# Patient Record
Sex: MALE | Race: WHITE | NOT HISPANIC OR LATINO | Employment: FULL TIME
[De-identification: names, ages, dates, MRNs, and addresses within clinical notes are randomized per-mention and may not be internally consistent; named-entity substitution may affect disease eponyms.]

---

## 2017-09-08 ENCOUNTER — SURGERY (OUTPATIENT)
Age: 62
End: 2017-09-08

## 2017-09-08 ENCOUNTER — ANESTHESIA (OUTPATIENT)
Dept: ENDOSCOPY | Facility: HOSPITAL | Age: 62
DRG: 445 | End: 2017-09-08
Payer: COMMERCIAL

## 2017-09-08 ENCOUNTER — ANESTHESIA EVENT (OUTPATIENT)
Dept: ENDOSCOPY | Facility: HOSPITAL | Age: 62
DRG: 445 | End: 2017-09-08
Payer: COMMERCIAL

## 2017-09-08 ENCOUNTER — HOSPITAL ENCOUNTER (OUTPATIENT)
Facility: HOSPITAL | Age: 62
Discharge: HOME | DRG: 445 | End: 2017-09-09
Attending: HOSPITALIST | Admitting: HOSPITALIST
Payer: COMMERCIAL

## 2017-09-08 DIAGNOSIS — N17.9 AKI (ACUTE KIDNEY INJURY): ICD-10-CM

## 2017-09-08 DIAGNOSIS — R78.81 BACTEREMIA DUE TO GRAM-NEGATIVE BACTERIA: ICD-10-CM

## 2017-09-08 DIAGNOSIS — K80.50 CHOLEDOCHOLITHIASIS: ICD-10-CM

## 2017-09-08 DIAGNOSIS — R74.01 TRANSAMINITIS: ICD-10-CM

## 2017-09-08 DIAGNOSIS — I10 ESSENTIAL HYPERTENSION: Chronic | ICD-10-CM

## 2017-09-08 DIAGNOSIS — D72.829 LEUKOCYTOSIS, UNSPECIFIED TYPE: ICD-10-CM

## 2017-09-08 DIAGNOSIS — N18.30 CKD STAGE 3 DUE TO TYPE 2 DIABETES MELLITUS: Chronic | ICD-10-CM

## 2017-09-08 DIAGNOSIS — N18.30 TYPE 2 DIABETES MELLITUS WITH STAGE 3 CHRONIC KIDNEY DISEASE, WITHOUT LONG-TERM CURRENT USE OF INSULIN: Chronic | ICD-10-CM

## 2017-09-08 DIAGNOSIS — E11.22 CKD STAGE 3 DUE TO TYPE 2 DIABETES MELLITUS: Chronic | ICD-10-CM

## 2017-09-08 DIAGNOSIS — E80.6 HYPERBILIRUBINEMIA: ICD-10-CM

## 2017-09-08 DIAGNOSIS — K80.43 CALCULUS OF BILE DUCT WITH ACUTE CHOLECYSTITIS AND OBSTRUCTION: Primary | ICD-10-CM

## 2017-09-08 DIAGNOSIS — E11.22 TYPE 2 DIABETES MELLITUS WITH STAGE 3 CHRONIC KIDNEY DISEASE, WITHOUT LONG-TERM CURRENT USE OF INSULIN: Chronic | ICD-10-CM

## 2017-09-08 PROBLEM — K21.9 GERD (GASTROESOPHAGEAL REFLUX DISEASE): Chronic | Status: ACTIVE | Noted: 2017-09-08

## 2017-09-08 PROBLEM — E88.09 HYPERPROTEINEMIA: Status: ACTIVE | Noted: 2017-09-08

## 2017-09-08 PROBLEM — K80.42 CHOLEDOCHOLITHIASIS WITH ACUTE CHOLECYSTITIS: Status: ACTIVE | Noted: 2017-09-08

## 2017-09-08 PROBLEM — E11.9 TYPE 2 DIABETES MELLITUS WITHOUT COMPLICATION, WITHOUT LONG-TERM CURRENT USE OF INSULIN: Chronic | Status: ACTIVE | Noted: 2017-09-08

## 2017-09-08 PROBLEM — E83.52 HYPERCALCEMIA: Status: ACTIVE | Noted: 2017-09-08

## 2017-09-08 PROBLEM — E78.5 HLD (HYPERLIPIDEMIA): Chronic | Status: ACTIVE | Noted: 2017-09-08

## 2017-09-08 LAB
POCT GLUCOSE: 177 MG/DL (ref 70–110)
POCT GLUCOSE: 264 MG/DL (ref 70–110)
POCT GLUCOSE: 287 MG/DL (ref 70–110)
POCT GLUCOSE: 316 MG/DL (ref 70–110)

## 2017-09-08 PROCEDURE — 43259 EGD US EXAM DUODENUM/JEJUNUM: CPT | Performed by: INTERNAL MEDICINE

## 2017-09-08 PROCEDURE — 27201674 HC SPHINCTERTOME: Performed by: INTERNAL MEDICINE

## 2017-09-08 PROCEDURE — 43260 ERCP W/SPECIMEN COLLECTION: CPT | Mod: ,,, | Performed by: INTERNAL MEDICINE

## 2017-09-08 PROCEDURE — 25000003 PHARM REV CODE 250: Performed by: NURSE PRACTITIONER

## 2017-09-08 PROCEDURE — G0378 HOSPITAL OBSERVATION PER HR: HCPCS

## 2017-09-08 PROCEDURE — 11000001 HC ACUTE MED/SURG PRIVATE ROOM

## 2017-09-08 PROCEDURE — 25000003 PHARM REV CODE 250: Performed by: HOSPITALIST

## 2017-09-08 PROCEDURE — 99254 IP/OBS CNSLTJ NEW/EST MOD 60: CPT | Mod: ,,, | Performed by: STUDENT IN AN ORGANIZED HEALTH CARE EDUCATION/TRAINING PROGRAM

## 2017-09-08 PROCEDURE — G0379 DIRECT REFER HOSPITAL OBSERV: HCPCS

## 2017-09-08 PROCEDURE — 87040 BLOOD CULTURE FOR BACTERIA: CPT | Mod: 59

## 2017-09-08 PROCEDURE — 25000003 PHARM REV CODE 250: Performed by: NURSE ANESTHETIST, CERTIFIED REGISTERED

## 2017-09-08 PROCEDURE — 94761 N-INVAS EAR/PLS OXIMETRY MLT: CPT

## 2017-09-08 PROCEDURE — 25000003 PHARM REV CODE 250: Performed by: PHYSICIAN ASSISTANT

## 2017-09-08 PROCEDURE — C1769 GUIDE WIRE: HCPCS | Performed by: INTERNAL MEDICINE

## 2017-09-08 PROCEDURE — 63600175 PHARM REV CODE 636 W HCPCS: Performed by: NURSE ANESTHETIST, CERTIFIED REGISTERED

## 2017-09-08 PROCEDURE — 43260 ERCP W/SPECIMEN COLLECTION: CPT | Performed by: INTERNAL MEDICINE

## 2017-09-08 PROCEDURE — 37000008 HC ANESTHESIA 1ST 15 MINUTES: Performed by: INTERNAL MEDICINE

## 2017-09-08 PROCEDURE — 63600175 PHARM REV CODE 636 W HCPCS: Performed by: HOSPITALIST

## 2017-09-08 PROCEDURE — 99222 1ST HOSP IP/OBS MODERATE 55: CPT | Mod: ,,, | Performed by: INTERNAL MEDICINE

## 2017-09-08 PROCEDURE — 74328 X-RAY BILE DUCT ENDOSCOPY: CPT | Mod: 26,,, | Performed by: INTERNAL MEDICINE

## 2017-09-08 PROCEDURE — 63600175 PHARM REV CODE 636 W HCPCS: Performed by: NURSE PRACTITIONER

## 2017-09-08 PROCEDURE — 43237 ENDOSCOPIC US EXAM ESOPH: CPT | Mod: 51,,, | Performed by: INTERNAL MEDICINE

## 2017-09-08 PROCEDURE — 37000009 HC ANESTHESIA EA ADD 15 MINS: Performed by: INTERNAL MEDICINE

## 2017-09-08 RX ORDER — HYDROMORPHONE HYDROCHLORIDE 2 MG/ML
0.4 INJECTION, SOLUTION INTRAMUSCULAR; INTRAVENOUS; SUBCUTANEOUS EVERY 5 MIN PRN
Status: DISCONTINUED | OUTPATIENT
Start: 2017-09-08 | End: 2017-09-09 | Stop reason: HOSPADM

## 2017-09-08 RX ORDER — LIDOCAINE HCL/PF 100 MG/5ML
SYRINGE (ML) INTRAVENOUS
Status: DISCONTINUED | OUTPATIENT
Start: 2017-09-08 | End: 2017-09-08

## 2017-09-08 RX ORDER — SODIUM CHLORIDE 0.9 % (FLUSH) 0.9 %
3 SYRINGE (ML) INJECTION
Status: DISCONTINUED | OUTPATIENT
Start: 2017-09-08 | End: 2017-09-09 | Stop reason: HOSPADM

## 2017-09-08 RX ORDER — GLUCAGON 1 MG
1 KIT INJECTION
Status: DISCONTINUED | OUTPATIENT
Start: 2017-09-08 | End: 2017-09-09 | Stop reason: HOSPADM

## 2017-09-08 RX ORDER — ONDANSETRON 2 MG/ML
4 INJECTION INTRAMUSCULAR; INTRAVENOUS DAILY PRN
Status: DISCONTINUED | OUTPATIENT
Start: 2017-09-08 | End: 2017-09-09 | Stop reason: HOSPADM

## 2017-09-08 RX ORDER — PROPOFOL 10 MG/ML
VIAL (ML) INTRAVENOUS
Status: DISCONTINUED | OUTPATIENT
Start: 2017-09-08 | End: 2017-09-08

## 2017-09-08 RX ORDER — SODIUM CHLORIDE 9 MG/ML
INJECTION, SOLUTION INTRAVENOUS CONTINUOUS
Status: DISCONTINUED | OUTPATIENT
Start: 2017-09-08 | End: 2017-09-09 | Stop reason: HOSPADM

## 2017-09-08 RX ORDER — FENTANYL CITRATE 50 UG/ML
INJECTION, SOLUTION INTRAMUSCULAR; INTRAVENOUS
Status: DISCONTINUED | OUTPATIENT
Start: 2017-09-08 | End: 2017-09-08

## 2017-09-08 RX ORDER — IBUPROFEN 200 MG
16 TABLET ORAL
Status: DISCONTINUED | OUTPATIENT
Start: 2017-09-08 | End: 2017-09-09 | Stop reason: HOSPADM

## 2017-09-08 RX ORDER — MORPHINE SULFATE 2 MG/ML
6 INJECTION, SOLUTION INTRAMUSCULAR; INTRAVENOUS EVERY 4 HOURS PRN
Status: DISCONTINUED | OUTPATIENT
Start: 2017-09-08 | End: 2017-09-09 | Stop reason: HOSPADM

## 2017-09-08 RX ORDER — CYCLOBENZAPRINE HCL 10 MG
10 TABLET ORAL NIGHTLY PRN
Status: DISCONTINUED | OUTPATIENT
Start: 2017-09-08 | End: 2017-09-09 | Stop reason: HOSPADM

## 2017-09-08 RX ORDER — RAMELTEON 8 MG/1
8 TABLET ORAL NIGHTLY PRN
Status: DISCONTINUED | OUTPATIENT
Start: 2017-09-08 | End: 2017-09-09 | Stop reason: HOSPADM

## 2017-09-08 RX ORDER — IBUPROFEN 200 MG
24 TABLET ORAL
Status: DISCONTINUED | OUTPATIENT
Start: 2017-09-08 | End: 2017-09-09 | Stop reason: HOSPADM

## 2017-09-08 RX ORDER — ONDANSETRON 8 MG/1
8 TABLET, ORALLY DISINTEGRATING ORAL EVERY 8 HOURS PRN
Status: DISCONTINUED | OUTPATIENT
Start: 2017-09-08 | End: 2017-09-09 | Stop reason: HOSPADM

## 2017-09-08 RX ORDER — INSULIN ASPART 100 [IU]/ML
0-5 INJECTION, SOLUTION INTRAVENOUS; SUBCUTANEOUS
Status: DISCONTINUED | OUTPATIENT
Start: 2017-09-08 | End: 2017-09-09 | Stop reason: HOSPADM

## 2017-09-08 RX ORDER — AMLODIPINE BESYLATE 5 MG/1
10 TABLET ORAL DAILY
Status: DISCONTINUED | OUTPATIENT
Start: 2017-09-09 | End: 2017-09-09 | Stop reason: HOSPADM

## 2017-09-08 RX ORDER — ACETAMINOPHEN 325 MG/1
650 TABLET ORAL EVERY 6 HOURS PRN
Status: DISCONTINUED | OUTPATIENT
Start: 2017-09-08 | End: 2017-09-09 | Stop reason: HOSPADM

## 2017-09-08 RX ORDER — PANTOPRAZOLE SODIUM 40 MG/1
40 TABLET, DELAYED RELEASE ORAL DAILY
Status: DISCONTINUED | OUTPATIENT
Start: 2017-09-09 | End: 2017-09-09 | Stop reason: HOSPADM

## 2017-09-08 RX ORDER — PROPOFOL 10 MG/ML
VIAL (ML) INTRAVENOUS CONTINUOUS PRN
Status: DISCONTINUED | OUTPATIENT
Start: 2017-09-08 | End: 2017-09-08

## 2017-09-08 RX ORDER — MORPHINE SULFATE 2 MG/ML
2 INJECTION, SOLUTION INTRAMUSCULAR; INTRAVENOUS EVERY 4 HOURS PRN
Status: DISCONTINUED | OUTPATIENT
Start: 2017-09-08 | End: 2017-09-09 | Stop reason: HOSPADM

## 2017-09-08 RX ORDER — MIDAZOLAM HYDROCHLORIDE 1 MG/ML
INJECTION, SOLUTION INTRAMUSCULAR; INTRAVENOUS
Status: DISCONTINUED | OUTPATIENT
Start: 2017-09-08 | End: 2017-09-08

## 2017-09-08 RX ADMIN — MEROPENEM 1 G: 1 INJECTION, POWDER, FOR SOLUTION INTRAVENOUS at 10:09

## 2017-09-08 RX ADMIN — INSULIN ASPART 3 UNITS: 100 INJECTION, SOLUTION INTRAVENOUS; SUBCUTANEOUS at 12:09

## 2017-09-08 RX ADMIN — SODIUM CHLORIDE: 0.9 INJECTION, SOLUTION INTRAVENOUS at 06:09

## 2017-09-08 RX ADMIN — TOPICAL ANESTHETIC 1 EACH: 200 SPRAY DENTAL; PERIODONTAL at 12:09

## 2017-09-08 RX ADMIN — PROPOFOL 10 MG: 10 INJECTION, EMULSION INTRAVENOUS at 12:09

## 2017-09-08 RX ADMIN — MEROPENEM 1 G: 1 INJECTION, POWDER, FOR SOLUTION INTRAVENOUS at 12:09

## 2017-09-08 RX ADMIN — MIDAZOLAM 2 MG: 1 INJECTION INTRAMUSCULAR; INTRAVENOUS at 12:09

## 2017-09-08 RX ADMIN — ACETAMINOPHEN 650 MG: 325 TABLET ORAL at 05:09

## 2017-09-08 RX ADMIN — MORPHINE SULFATE 2 MG: 2 INJECTION, SOLUTION INTRAMUSCULAR; INTRAVENOUS at 08:09

## 2017-09-08 RX ADMIN — LIDOCAINE HYDROCHLORIDE 50 MG: 20 INJECTION, SOLUTION INTRAVENOUS at 12:09

## 2017-09-08 RX ADMIN — PROPOFOL 150 MCG/KG/MIN: 10 INJECTION, EMULSION INTRAVENOUS at 12:09

## 2017-09-08 RX ADMIN — CYCLOBENZAPRINE HYDROCHLORIDE 10 MG: 10 TABLET, FILM COATED ORAL at 10:09

## 2017-09-08 RX ADMIN — SODIUM CHLORIDE: 0.9 INJECTION, SOLUTION INTRAVENOUS at 10:09

## 2017-09-08 RX ADMIN — FENTANYL CITRATE 100 MCG: 50 INJECTION, SOLUTION INTRAMUSCULAR; INTRAVENOUS at 12:09

## 2017-09-08 NOTE — ASSESSMENT & PLAN NOTE
CKD 3   BUN 33/Cr 1.9 with unknown baseline. Nephrologist is Dr. Lon Das in [place].  Continue IVF.  Holding losartan-HCTZ.  Avoid nephrotoxins and renally dose meds. Monitor.

## 2017-09-08 NOTE — HOSPITAL COURSE
"He was seen promptly upon arrival by [facility].  Dr. Roque Lindsay performed EUS and ERCP finding no stones.  He was feeling much better and requesting to eat and drink.  His wife, Ania, was present and stated that he looked "a million times better" than earlier when he was in the ED at [facility].  He denied abdominal pain, nausea, chest pain, and shortness of breath.  General Surgery was also consulted to see him and recommended further management outpatient.  One of the two sets of blood cultures taken at [place] grew Gram negative rods.  He was already on meropenem.  Repeat blood cultures were taken.  He was discharged home on 9/9/17 with prescriptions for 7 days of ciprofloxacin and metronidazole.  He will follow up with a surgeon back home.  "

## 2017-09-08 NOTE — ASSESSMENT & PLAN NOTE
Hyperproteinemia   Likely elevated 2/2 dehydration. Corrected calcium is 9.7.  Continue IVF. Monitor.

## 2017-09-08 NOTE — ASSESSMENT & PLAN NOTE
Pt states last A1c 7.7%.  BG > 200 since arrival to [facility].  On glipizide 10 mg BID, metformin 850 mg TID, nateglinide 60 mg prn BG > 200, pioglitazone 15 mg daily and saxaglipitin 5 mg daily at home. Holding all here. Check blood glucose AC and HS and use SSI.  Check A1c.  Diabetic diet when tolerating PO.  Endocrinologist is Dr. Delfino Cr.

## 2017-09-08 NOTE — CONSULTS
[facility]  Gastroenterology  Consult Note    Patient Name: Elmer Boateng  MRN: [id number]  Admission Date: 9/8/2017  Hospital Length of Stay: 0 days  Code Status: Full Code   Attending Provider: Gaetano Mota MD   Consulting Provider: Gregoria Spears MD  Primary Care Physician: Raffaele Ballard MD  Principal Problem:Calculus of bile duct with acute cholecystitis and obstruction    Consults  Subjective:     HPI:  This is a 62-year-old male with a past medical history of diabetes and hypertension who presents with 6 days of abdominal pain.  He states the pain is located in the lower chest and epigastric region.  It is nonradiating and constant.  It is described as a sharp pain, moderate in severity.  Some associated nausea but no vomiting.  Denies any melena, changes in bowel habits.  He does endorse some dark urine.  He denies any similar symptoms in the past.  No other exacerbating or relieving factors or other associated symptoms. No fevers/chills.     Past Medical History:   Diagnosis Date    Diabetes mellitus     GERD (gastroesophageal reflux disease)     HTN (hypertension)     Hypercholesteremia        History reviewed. No pertinent surgical history.    Review of patient's allergies indicates:  No Known Allergies  Family History     None        Social History Main Topics    Smoking status: Never Smoker    Smokeless tobacco: Never Used    Alcohol use Not on file    Drug use: Unknown    Sexual activity: Not on file     Review of Systems   Constitutional: Negative for chills and fever.   HENT: Negative for postnasal drip and trouble swallowing.    Eyes: Negative for pain and visual disturbance.   Respiratory: Negative for cough, choking and shortness of breath.    Cardiovascular: Negative for chest pain and leg swelling.   Gastrointestinal: Positive for abdominal distention, abdominal pain and nausea. Negative for anal bleeding, blood in stool, constipation, diarrhea, rectal pain and  vomiting.   Endocrine: Negative for cold intolerance and heat intolerance.   Genitourinary: Negative for difficulty urinating and hematuria.   Musculoskeletal: Positive for arthralgias. Negative for back pain.   Neurological: Negative for dizziness and numbness.   Hematological: Negative for adenopathy. Does not bruise/bleed easily.   Psychiatric/Behavioral: Negative for agitation and confusion.     Objective:     Vital Signs (Most Recent):  Temp: 99.9 °F (37.7 °C) (09/08/17 0925)  Pulse: 72 (09/08/17 0925)  Resp: 19 (09/08/17 0925)  BP: 139/69 (09/08/17 0925)  SpO2: 96 % (09/08/17 0925) Vital Signs (24h Range):  Temp:  [97.6 °F (36.4 °C)-99.9 °F (37.7 °C)] 99.9 °F (37.7 °C)  Pulse:  [57-84] 72  Resp:  [14-20] 19  SpO2:  [94 %-100 %] 96 %  BP: (106-162)/(56-72) 139/69     Weight: 86.8 kg (191 lb 5.8 oz) (09/08/17 0925)  Body mass index is 28.26 kg/m².    No intake or output data in the 24 hours ending 09/08/17 1114    Lines/Drains/Airways     Peripheral Intravenous Line                 Peripheral IV - Single Lumen 09/08/17 0301 Right Antecubital less than 1 day                Physical Exam   Constitutional: He is oriented to person, place, and time. He appears well-developed and well-nourished. No distress.   HENT:   Head: Normocephalic and atraumatic.   Eyes: Conjunctivae are normal. Scleral icterus is present.   Neck: No tracheal deviation present. No thyromegaly present.   Cardiovascular: Normal rate, regular rhythm and normal heart sounds.  Exam reveals no gallop and no friction rub.    No murmur heard.  Pulmonary/Chest: Effort normal and breath sounds normal. He has no wheezes. He has no rales.   Abdominal: Soft. Bowel sounds are normal. He exhibits no distension. There is tenderness. There is no rebound and no guarding.   Musculoskeletal: Normal range of motion. He exhibits no edema or tenderness.   Neurological: He is alert and oriented to person, place, and time.   Skin: He is not diaphoretic.   Psychiatric:  He has a normal mood and affect. His behavior is normal.       Significant Labs:  CMP:   Recent Labs  Lab 09/08/17  0302   *   CALCIUM 10.3*   ALBUMIN 4.7   PROT 8.5*      K 4.4   CO2 28   CL 95   BUN 33*   CREATININE 1.90*   ALKPHOS 150*   *   *   BILITOT 3.4*       Significant Imaging:  U/S: I have reviewed all results within the past 24 hours and my personal findings are:  cbd dilated    Assessment/Plan:     Transaminitis    - imaging reviewed, biliary ductal dilation with tb 3.4 and elevated transaminases  - will proceed with EUS, ERCP if indicated   - risks/benefits explained in detail   - d/w Dr. Lindsay  - IVF   - pain control  - surgery consulted            Thank you for your consult. I will follow-up with patient. Please contact us if you have any additional questions.    Gregoria Spears MD  Gastroenterology  [facility]

## 2017-09-08 NOTE — H&P
"[facility] Hospital Medicine  History & Physical    Patient Name: Elmer Boateng  MRN: [id number]  Admission Date: 9/8/2017  Attending Physician: Gaetano Mota MD   Primary Care Provider: Raffaele Ballard MD         Patient information was obtained from patient, spouse/SO and ER records.     Subjective:     Principal Problem:Calculus of bile duct with acute cholecystitis and obstruction    Chief Complaint: No chief complaint on file.       HPI: 63 yo male with history of diabetes, hypertension and hyperlipidemia presented to [facility] in [place], [place] on 9/8/2017 with c/o midepigastric abdominal pain and chest pain accompanied by nausea and vomiting for past 5 days. Work-up there concerning for choledocholithiasis and cholecystitis. He was transferred to [facility] for admission to [facility] with GI consult.  Elevated WBC, t. Bili, Alk Phos, AST, ALT, calcium and protein.      Pt was already seen by GI and EUS and ERCP performed showing no stones.  Pt feeling much better and requesting to eat and drink.  Wife, Ania, present and states that he looks "a million times better" than earlier today when he was in the ED at [facility].   Denies abdominal pain, nausea, CP, SOB.     Pt has had numerous kidney stones and has had 2 surgical removals and 2 lithotripsy procedures in the past.  Pt denies use of alcohol, tobacco or illicit drugs.  Works in an oil field support role.  with a 17 yo son.  PCP is Dr. Raffaele Ballard; Endocrinologist is Dr. Delfino Cr; Nephrologist is Dr. Lon Das; Surgeon is Dr. Delfino Gutierrez.          Past Medical History:   Diagnosis Date    Diabetes mellitus     GERD (gastroesophageal reflux disease)     HTN (hypertension)     Hypercholesteremia        Past Surgical History:   Procedure Laterality Date    HERNIA REPAIR      KNEE SURGERY      LITHOTRIPSY         Review of patient's allergies indicates:  No Known " Allergies    Current Facility-Administered Medications on File Prior to Encounter   Medication    [COMPLETED] 0.9%  NaCl infusion    [COMPLETED] morphine injection 4 mg    [COMPLETED] morphine injection 4 mg    [COMPLETED] ondansetron injection 4 mg    [COMPLETED] piperacillin-tazobactam 4.5 g in dextrose 5 % 100 mL IVPB (ready to mix system)    [COMPLETED] prochlorperazine injection Soln 10 mg    [COMPLETED] sodium chloride 0.9% bolus 1,000 mL     Current Outpatient Prescriptions on File Prior to Encounter   Medication Sig    amlodipine (NORVASC) 10 MG tablet Take 10 mg by mouth once daily.    bromocriptine (PARLODEL) 2.5 mg Tab Take 2.5 mg by mouth once daily.    cholecalciferol, vitamin D3, 5,000 unit TbDL Take 1 tablet by mouth once daily.    glipiZIDE (GLUCOTROL) 10 MG TR24 Take 10 mg by mouth 2 (two) times daily.     losartan-hydrochlorothiazide 50-12.5 mg (HYZAAR) 50-12.5 mg per tablet Take 1 tablet by mouth once daily.    metformin (GLUCOPHAGE) 850 MG tablet Take 850 mg by mouth 3 (three) times daily.    pantoprazole (PROTONIX) 40 MG tablet Take 40 mg by mouth once daily.    pioglitazone (ACTOS) 15 MG tablet Take 15 mg by mouth once daily.    pravastatin (PRAVACHOL) 20 MG tablet Take 20 mg by mouth once daily.    SAXagliptin (ONGLYZA) 5 mg Tab tablet Take by mouth once daily.    cyclobenzaprine (FLEXERIL) 10 MG tablet Take 10 mg by mouth nightly as needed for Muscle spasms.     nateglinide (STARLIX) 120 MG tablet Take 60 mg by mouth daily as needed (If blood glucose > 200).     [DISCONTINUED] co-enzyme Q-10 30 mg capsule Take 30 mg by mouth 3 (three) times daily.     Family History     Problem Relation (Age of Onset)    COPD Mother    Diabetes Mother, Sister    Heart disease Father        Social History Main Topics    Smoking status: Never Smoker    Smokeless tobacco: Never Used    Alcohol use No    Drug use: No    Sexual activity: Not on file     Review of Systems   Constitutional:  Positive for appetite change. Negative for chills, fatigue and fever.   HENT: Negative for congestion, postnasal drip, sneezing and sore throat.    Eyes: Negative for discharge, redness and itching.   Respiratory: Negative for cough, shortness of breath and wheezing.    Cardiovascular: Negative for chest pain, palpitations and leg swelling.   Gastrointestinal: Positive for abdominal pain and vomiting. Negative for abdominal distention, blood in stool, constipation and diarrhea.   Endocrine: Negative for polydipsia, polyphagia and polyuria.   Genitourinary: Negative for difficulty urinating, dysuria, flank pain, frequency, hematuria and urgency.   Musculoskeletal: Negative for arthralgias and myalgias.   Skin: Negative for pallor, rash and wound.   Neurological: Negative for dizziness, syncope, weakness, light-headedness, numbness and headaches.   Psychiatric/Behavioral: Negative for agitation and confusion. The patient is not nervous/anxious.      Objective:     Vital Signs (Most Recent):  Temp: 99 °F (37.2 °C) (09/08/17 1436)  Pulse: 66 (09/08/17 1436)  Resp: (!) 24 (09/08/17 1436)  BP: (!) 169/77 (09/08/17 1436)  SpO2: 98 % (09/08/17 1405) Vital Signs (24h Range):  Temp:  [97.1 °F (36.2 °C)-99.9 °F (37.7 °C)] 99 °F (37.2 °C)  Pulse:  [57-84] 66  Resp:  [9-24] 24  SpO2:  [94 %-100 %] 98 %  BP: ()/(52-77) 169/77     Weight: 86.8 kg (191 lb 5.8 oz)  Body mass index is 28.26 kg/m².    Physical Exam   Constitutional: He is oriented to person, place, and time. He appears well-developed and well-nourished. No distress.   HENT:   Head: Normocephalic and atraumatic.   Mouth/Throat: Oropharynx is clear and moist. No oropharyngeal exudate.   Eyes: Conjunctivae and EOM are normal. Pupils are equal, round, and reactive to light. No scleral icterus.   Neck: Normal range of motion. Neck supple. No JVD present. No thyromegaly present.   Cardiovascular: Normal rate and regular rhythm.    No murmur heard.  Pulmonary/Chest:  Effort normal and breath sounds normal. No respiratory distress. He has no wheezes. He exhibits no tenderness.   Abdominal: Soft. Bowel sounds are normal. He exhibits no distension. There is no tenderness. There is no rebound and no guarding.   Musculoskeletal: Normal range of motion. He exhibits no edema or deformity.   Neurological: He is alert and oriented to person, place, and time.   Skin: Skin is warm and dry. No rash noted. He is not diaphoretic.   Psychiatric: He has a normal mood and affect. His behavior is normal. Judgment and thought content normal.        Significant Labs:   CBC:   Recent Labs  Lab 09/08/17  0302   WBC 14.30*   HGB 12.1*   HCT 35.7*        CMP:   Recent Labs  Lab 09/08/17 0302      K 4.4   CL 95   CO2 28   *   BUN 33*   CREATININE 1.90*   CALCIUM 10.3*   PROT 8.5*   ALBUMIN 4.7   BILITOT 3.4*   ALKPHOS 150*   *   *   EGFRNONAA 37*     Troponin:   Recent Labs  Lab 09/08/17 0302   TROPONINI <0.012     Urine Studies:   Recent Labs  Lab 09/08/17  0535   COLORU Yellow   APPEARANCEUA Clear   PHUR 5.5   SPECGRAV 1.015   PROTEINUA 30*   GLUCUA 500*   KETONESU 15*   BILIRUBINUA Negative   OCCULTUA Trace*   NITRITE Negative   UROBILINOGEN 2.0*   LEUKOCYTESUR Negative   RBCUA 0   WBCUA 1   BACTERIA None   SQUAMEPITHEL 1   HYALINECASTS 0       Significant Imaging:     US Abdomen 8/8/2017:  Evidence of tiny gallstones/sludge with no sonographic findings to suggest acute cholecystitis.  Mild dilatation of the CBD with no sonographic evidence of choledocholithiasis.    Flat and erect abdomen 9/8/17:  Nonobstructive bowel gas pattern. Bilateral nephrolithiasis.    CXR PA and lateral 9/8/2017:   Findings: Cardiac silhouette is normal in size. No focal infiltrate or pleural effusion. There is mild thoracic spondylosis.    CT Abdomen/Pelvis 9/8/2017:  No evidence of an acute intra-abdominal abnormality.  Mildly distended gallbladder containing sludge and/or  gallstones.  Bilateral nephrolithiasis without signs of obstructive uropathy.  Colonic diverticulosis.        Assessment/Plan:     * Calculus of bile duct with acute cholecystitis and obstruction    Leukocytosis, hyperbilirubinemia, transaminitis   Pt presented to OSH with c/o abdominal pain and vomiting x 5 days.  WBC 14K, T. Bili 3.4,  and . Imaging suggests gallstones and mild dilation in CBD.  GI performed EUS/ERCP this morning: no stones seen.  Continue meropenem, PRN analgesia and IVF.  Pt requesting to have surgery done in [place] (Dr. Delfino Gutierrez) if it can possibly wait. General Surgery consult.                 Type 2 diabetes mellitus with stage 3 chronic kidney disease, without long-term current use of insulin    Pt states last A1c 7.7%.  BG > 200 since arrival to [facility].  On glipizide 10 mg BID, metformin 850 mg TID, nateglinide 60 mg prn BG > 200, pioglitazone 15 mg daily and saxaglipitin 5 mg daily at home. Holding all here. Check blood glucose AC and HS and use SSI.  Check A1c.  Diabetic diet when tolerating PO.  Endocrinologist is Dr. Delfino Cr.           KATHARINE (acute kidney injury)    CKD 3   BUN 33/Cr 1.9 with unknown baseline. Nephrologist is Dr. Lon Das in [place].  Continue IVF.  Holding losartan-HCTZ.  Avoid nephrotoxins and renally dose meds. Monitor.           GERD (gastroesophageal reflux disease)    Continue home pantaprazole 40 mg daily.           Essential hypertension    Fluctuating BP.  On amlodipine 10 mg daily and losartan-HCTZ 50-12.5 mg daily at home.  Monitor. Resume home amlodipine with parameters. Hold losartan-HCTZ due to elevated creatinine with unknown baseline.           HLD (hyperlipidemia)    Check lipid panel with AM labs.  On pravastatin 20 mg and co-Q-10 200 mg daily which will be held here for now.            Hypercalcemia    Hyperproteinemia   Likely elevated 2/2 dehydration. Corrected calcium is 9.7.  Continue IVF. Monitor.             VTE Risk  Mitigation         Ordered     Medium Risk of VTE  Once      09/08/17 0942     Place IGNACIO hose  Until discontinued      09/08/17 0942     Place sequential compression device  Until discontinued      09/08/17 0942             Anabella Damian PA-C  Department of [facility]   [facility]  Pager: [phone]    Attending: Gaetano Mota MD

## 2017-09-08 NOTE — ASSESSMENT & PLAN NOTE
Leukocytosis, hyperbilirubinemia, transaminitis   Pt presented to OSH with c/o abdominal pain and vomiting x 5 days.  WBC 14K, T. Bili 3.4,  and . Imaging suggests gallstones and mild dilation in CBD.  GI performed EUS/ERCP this morning: no stones seen.  Continue meropenem, PRN analgesia and IVF.  Pt requesting to have surgery done in [place] (Dr. Delfino Gutierrez) if it can possibly wait. General Surgery consult.

## 2017-09-08 NOTE — TRANSFER OF CARE
"Anesthesia Transfer of Care Note    Patient: Elmer Boateng    Procedure(s) Performed: Procedure(s) (LRB):  ULTRASOUND-ENDOSCOPIC-UPPER (N/A)  ERCP (N/A)    Patient location: PACU    Anesthesia Type: MAC    Transport from OR: Transported from OR on room air with adequate spontaneous ventilation    Post pain: adequate analgesia    Post assessment: no apparent anesthetic complications    Post vital signs: stable    Level of consciousness: responds to stimulation    Nausea/Vomiting: no nausea/vomiting    Complications: none    Transfer of care protocol was followed      Last vitals:   Visit Vitals  BP (!) 140/74 (Patient Position: Lying)   Pulse 71   Temp 36.5 °C (97.7 °F) (Oral)   Resp 17   Ht 5' 9" (1.753 m)   Wt 86.8 kg (191 lb 5.8 oz)   SpO2 (!) 94%   BMI 28.26 kg/m²     "

## 2017-09-08 NOTE — CONSULTS
[facility]  General Surgery  Consult Note    Consults  Subjective:     Chief Complaint/Reason for Admission: epigastric pain, NV    History of Present Illness: 62M presented initially to [facility] in [place] with several days of postprandial epigastric pain, NV. He had never had this before. He was found to have gallstones, hyperbilirubinemia and a dilated CBD on evaluation there. Subsequently he was transferred to [facility] for GI evaluation. He was admitted, GI was consulted and ERCP was performed. He had no evidence of choledocho in ERCP. He is now asymptomatic.     Current Facility-Administered Medications on File Prior to Encounter   Medication    [COMPLETED] 0.9%  NaCl infusion    [COMPLETED] morphine injection 4 mg    [COMPLETED] morphine injection 4 mg    [COMPLETED] ondansetron injection 4 mg    [COMPLETED] piperacillin-tazobactam 4.5 g in dextrose 5 % 100 mL IVPB (ready to mix system)    [COMPLETED] prochlorperazine injection Soln 10 mg    [COMPLETED] sodium chloride 0.9% bolus 1,000 mL     Current Outpatient Prescriptions on File Prior to Encounter   Medication Sig    amlodipine (NORVASC) 10 MG tablet Take 10 mg by mouth once daily.    bromocriptine (PARLODEL) 2.5 mg Tab Take 2.5 mg by mouth once daily.    cholecalciferol, vitamin D3, 5,000 unit TbDL Take 1 tablet by mouth once daily.    glipiZIDE (GLUCOTROL) 10 MG TR24 Take 10 mg by mouth 2 (two) times daily.     losartan-hydrochlorothiazide 50-12.5 mg (HYZAAR) 50-12.5 mg per tablet Take 1 tablet by mouth once daily.    metformin (GLUCOPHAGE) 850 MG tablet Take 850 mg by mouth 3 (three) times daily.    pantoprazole (PROTONIX) 40 MG tablet Take 40 mg by mouth once daily.    pioglitazone (ACTOS) 15 MG tablet Take 15 mg by mouth once daily.    pravastatin (PRAVACHOL) 20 MG tablet Take 20 mg by mouth once daily.    SAXagliptin (ONGLYZA) 5 mg Tab tablet Take by mouth once daily.    cyclobenzaprine (FLEXERIL) 10 MG tablet Take 10  mg by mouth nightly as needed for Muscle spasms.     nateglinide (STARLIX) 120 MG tablet Take 60 mg by mouth daily as needed (If blood glucose > 200).     [DISCONTINUED] co-enzyme Q-10 30 mg capsule Take 30 mg by mouth 3 (three) times daily.       Review of patient's allergies indicates:  No Known Allergies    Past Medical History:   Diagnosis Date    Diabetes mellitus     GERD (gastroesophageal reflux disease)     HTN (hypertension)     Hypercholesteremia      Past Surgical History:   Procedure Laterality Date    HERNIA REPAIR      KNEE SURGERY      LITHOTRIPSY       Avinash inguinal hernia reapirs  Appendectomy when teenager    Family History     Problem Relation (Age of Onset)    COPD Mother    Diabetes Mother, Sister    Heart disease Father        Social History Main Topics    Smoking status: Never Smoker    Smokeless tobacco: Never Used    Alcohol use No    Drug use: No    Sexual activity: Not on file     Review of Systems  Objective:     Vital Signs (Most Recent):  Temp: 99 °F (37.2 °C) (09/08/17 1436)  Pulse: 66 (09/08/17 1436)  Resp: (!) 24 (09/08/17 1436)  BP: (!) 169/77 (09/08/17 1436)  SpO2: 98 % (09/08/17 1405) Vital Signs (24h Range):  Temp:  [97.1 °F (36.2 °C)-99.9 °F (37.7 °C)] 99 °F (37.2 °C)  Pulse:  [57-84] 66  Resp:  [9-24] 24  SpO2:  [94 %-100 %] 98 %  BP: ()/(52-77) 169/77     Weight: 86.8 kg (191 lb 5.8 oz)  Body mass index is 28.26 kg/m².      Intake/Output Summary (Last 24 hours) at 09/08/17 1713  Last data filed at 09/08/17 1443   Gross per 24 hour   Intake              500 ml   Output              175 ml   Net              325 ml       Physical Exam   Constitutional: He is oriented to person, place, and time. He appears well-developed and well-nourished. No distress.   HENT:   Head: Normocephalic and atraumatic.   Eyes: Pupils are equal, round, and reactive to light. Scleral icterus is present.   Neck: Normal range of motion.   Cardiovascular: Normal rate and regular rhythm.     Pulmonary/Chest: Effort normal. No respiratory distress. He has no wheezes.   Abdominal: Soft. He exhibits distension. He exhibits no mass. There is no tenderness. There is no rebound and no guarding.   Musculoskeletal: Normal range of motion.   Neurological: He is alert and oriented to person, place, and time.   Skin: Skin is warm and dry. He is not diaphoretic.   Psychiatric: He has a normal mood and affect.       Significant Labs:  CBC:   Recent Labs  Lab 09/08/17  0302   WBC 14.30*   RBC 3.64*   HGB 12.1*   HCT 35.7*      MCV 98   MCH 33.4*   MCHC 34.0     CMP:   Recent Labs  Lab 09/08/17  0302   *   CALCIUM 10.3*   ALBUMIN 4.7   PROT 8.5*      K 4.4   CO2 28   CL 95   BUN 33*   CREATININE 1.90*   ALKPHOS 150*   *   *   BILITOT 3.4*     Lipase:   Recent Labs  Lab 09/08/17  0302   LIPASE 161       Significant Diagnostics:  U/S: I have reviewed all pertinent results/findings within the past 24 hours. 4mm CBD. +stones    Assessment/Plan:     Active Diagnoses:    Diagnosis Date Noted POA    PRINCIPAL PROBLEM:  Calculus of bile duct with acute cholecystitis and obstruction [K80.43] 09/08/2017 Yes    Type 2 diabetes mellitus with stage 3 chronic kidney disease, without long-term current use of insulin [E11.22, N18.3] 09/08/2017 Yes     Chronic    Essential hypertension [I10] 09/08/2017 Yes     Chronic    Transaminitis [R74.0] 09/08/2017 Yes    Hyperbilirubinemia [E80.6] 09/08/2017 Yes    Leukocytosis [D72.829] 09/08/2017 Yes    GERD (gastroesophageal reflux disease) [K21.9] 09/08/2017 Yes     Chronic    HLD (hyperlipidemia) [E78.5] 09/08/2017 Yes     Chronic    Hypercalcemia [E83.52] 09/08/2017 Yes    KATHARINE (acute kidney injury) [N17.9] 09/08/2017 Yes    Hyperproteinemia [E88.09] 09/08/2017 Yes    CKD stage 3 due to type 2 diabetes mellitus [E11.22, N18.3] 09/08/2017 Yes     Chronic      Problems Resolved During this Admission:    Diagnosis Date Noted Date Resolved POA      Recommended cholecystectomy. Patient lives in [place] and prefers to have his surgery there with Dr Gutierrez. I think this is reasonable if he agrees to arrange this in a timely fashion. He will stay overnight tonight for repeat CMP in the morning. Ok to have regular diet. Recommend bland food until he can have surgery. If Bili trending down ok to discharge in the morning.     Thank you for your consult.     Grayson Jacobs MD  General Surgery  [facility]

## 2017-09-08 NOTE — ASSESSMENT & PLAN NOTE
- imaging reviewed, biliary ductal dilation with tb 3.4 and elevated transaminases  - will proceed with EUS, ERCP if indicated   - risks/benefits explained in detail   - d/w Dr. Lindsay  - IVF   - pain control  - surgery consulted

## 2017-09-08 NOTE — PLAN OF CARE
Pt has met discharge criteria from pacu, report was given to Dr. Gutierrez and she released pt. Report was called to Rosa Maria on 5A. Call also placed to Harriet in [facility] to ask Dr. Lindsay about putting in post procedure diet order. Pt is easily arousable, VSS, O2 sat on Room Air 98%.Abd soft, non distended.

## 2017-09-08 NOTE — HPI
Elmer Boateng is a 62 year old white man with history of diabetes mellitus type 2, hypertension, hyperlipidemia, and kidney stones requiring 2 surgical removals and 2 lithotripsy procedures.  He is  with a 16 year old son.  He works in an oil field support role.  His primary care physician is Dr. Raffaele Ballard.  His endocrinologist is Dr. Delfino Cr.  His nephrologist is Dr. Lon Das.  His surgeon is Dr. Delfino Gutierrez.              He presented to [facility] in [place], [place] on 9/8/2017 with complaint of midepigastric abdominal pain and chest pain accompanied by nausea and vomiting for the past 5 days.  Work-up there was concerning for choledocholithiasis and cholecystitis.  He was transferred to [facility] for admission to [facility] with Gastroenterology consult.  He had elevated WBC, total bilirubin, alkaline phosphatase, AST, ALT, calcium and protein.

## 2017-09-08 NOTE — PROGRESS NOTES
Estimated Creatinine Clearance: 44.5 mL/min (based on SCr of 1.9 mg/dL (H)).     Meropenem 1gm ivpb q8h renal dose adjusted to   meropenem 1gm ivpb q12h per p&t   approved pharmacy protocol

## 2017-09-08 NOTE — ASSESSMENT & PLAN NOTE
Check lipid panel with AM labs.  On pravastatin 20 mg and co-Q-10 200 mg daily which will be held here for now.

## 2017-09-08 NOTE — SUBJECTIVE & OBJECTIVE
Past Medical History:   Diagnosis Date    Diabetes mellitus     GERD (gastroesophageal reflux disease)     HTN (hypertension)     Hypercholesteremia        History reviewed. No pertinent surgical history.    Review of patient's allergies indicates:  No Known Allergies  Family History     None        Social History Main Topics    Smoking status: Never Smoker    Smokeless tobacco: Never Used    Alcohol use Not on file    Drug use: Unknown    Sexual activity: Not on file     Review of Systems   Constitutional: Negative for chills and fever.   HENT: Negative for postnasal drip and trouble swallowing.    Eyes: Negative for pain and visual disturbance.   Respiratory: Negative for cough, choking and shortness of breath.    Cardiovascular: Negative for chest pain and leg swelling.   Gastrointestinal: Positive for abdominal distention, abdominal pain and nausea. Negative for anal bleeding, blood in stool, constipation, diarrhea, rectal pain and vomiting.   Endocrine: Negative for cold intolerance and heat intolerance.   Genitourinary: Negative for difficulty urinating and hematuria.   Musculoskeletal: Positive for arthralgias. Negative for back pain.   Neurological: Negative for dizziness and numbness.   Hematological: Negative for adenopathy. Does not bruise/bleed easily.   Psychiatric/Behavioral: Negative for agitation and confusion.     Objective:     Vital Signs (Most Recent):  Temp: 99.9 °F (37.7 °C) (09/08/17 0925)  Pulse: 72 (09/08/17 0925)  Resp: 19 (09/08/17 0925)  BP: 139/69 (09/08/17 0925)  SpO2: 96 % (09/08/17 0925) Vital Signs (24h Range):  Temp:  [97.6 °F (36.4 °C)-99.9 °F (37.7 °C)] 99.9 °F (37.7 °C)  Pulse:  [57-84] 72  Resp:  [14-20] 19  SpO2:  [94 %-100 %] 96 %  BP: (106-162)/(56-72) 139/69     Weight: 86.8 kg (191 lb 5.8 oz) (09/08/17 0925)  Body mass index is 28.26 kg/m².    No intake or output data in the 24 hours ending 09/08/17 1114    Lines/Drains/Airways     Peripheral Intravenous Line                  Peripheral IV - Single Lumen 09/08/17 0301 Right Antecubital less than 1 day                Physical Exam   Constitutional: He is oriented to person, place, and time. He appears well-developed and well-nourished. No distress.   HENT:   Head: Normocephalic and atraumatic.   Eyes: Conjunctivae are normal. Scleral icterus is present.   Neck: No tracheal deviation present. No thyromegaly present.   Cardiovascular: Normal rate, regular rhythm and normal heart sounds.  Exam reveals no gallop and no friction rub.    No murmur heard.  Pulmonary/Chest: Effort normal and breath sounds normal. He has no wheezes. He has no rales.   Abdominal: Soft. Bowel sounds are normal. He exhibits no distension. There is tenderness. There is no rebound and no guarding.   Musculoskeletal: Normal range of motion. He exhibits no edema or tenderness.   Neurological: He is alert and oriented to person, place, and time.   Skin: He is not diaphoretic.   Psychiatric: He has a normal mood and affect. His behavior is normal.       Significant Labs:  CMP:   Recent Labs  Lab 09/08/17  0302   *   CALCIUM 10.3*   ALBUMIN 4.7   PROT 8.5*      K 4.4   CO2 28   CL 95   BUN 33*   CREATININE 1.90*   ALKPHOS 150*   *   *   BILITOT 3.4*       Significant Imaging:  U/S: I have reviewed all results within the past 24 hours and my personal findings are:  cbd dilated

## 2017-09-08 NOTE — ASSESSMENT & PLAN NOTE
Fluctuating BP.  On amlodipine 10 mg daily and losartan-HCTZ 50-12.5 mg daily at home.  Monitor. Resume home amlodipine with parameters. Hold losartan-HCTZ due to elevated creatinine with unknown baseline.

## 2017-09-08 NOTE — HPI
This is a 62-year-old male with a past medical history of diabetes and hypertension who presents with 6 days of abdominal pain.  He states the pain is located in the lower chest and epigastric region.  It is nonradiating and constant.  It is described as a sharp pain, moderate in severity.  Some associated nausea but no vomiting.  Denies any melena, changes in bowel habits.  He does endorse some dark urine.  He denies any similar symptoms in the past.  No other exacerbating or relieving factors or other associated symptoms. No fevers/chills.

## 2017-09-08 NOTE — ANESTHESIA PREPROCEDURE EVALUATION
09/08/2017  Elmer Boateng is a 62 y.o., male with bile duct obstruction for upper EUS and ERCP under MAC/GA    Anesthesia Evaluation     I have reviewed the Nursing Notes.   I have reviewed the Medications.     Review of Systems  Anesthesia Hx:   Denies Personal Hx of Anesthesia complications.   Social:  No Alcohol Use, Non-Smoker   Hematology/Oncology:         -- Anemia:   Cardiovascular:   Hypertension hyperlipidemia    Renal/:   Chronic Renal Disease, CRI    Hepatic/GI:   GERD Elevated liver enzymes   Endocrine:   Diabetes        Physical Exam  General:  Well nourished    Airway/Jaw/Neck:  Airway Findings: Mouth Opening: Normal Tongue: Normal  Mallampati: III  TM Distance: Normal, at least 6 cm      Dental:  Dental Findings: (permanent) upper partial dentures   Chest/Lungs:  Chest/Lungs Clear    Heart/Vascular:  Heart Findings: Normal       Mental Status:  Mental Status Findings:  Alert and Oriented       Lab Results   Component Value Date    WBC 14.30 (H) 09/08/2017    HGB 12.1 (L) 09/08/2017    HCT 35.7 (L) 09/08/2017     09/08/2017     (H) 09/08/2017     (H) 09/08/2017     09/08/2017    K 4.4 09/08/2017    CL 95 09/08/2017    CREATININE 1.90 (H) 09/08/2017    BUN 33 (H) 09/08/2017    CO2 28 09/08/2017    INR 0.9 09/08/2017     EKG  Normal sinus rhythm  Possible Left atrial enlargement  Borderline Abnormal ECG  No previous ECGs available  Confirmed by Miguelangel Oh MD ([id number]) on 9/8/2017 10:46:07 AM      Anesthesia Plan  Type of Anesthesia, risks & benefits discussed:  Anesthesia Type:  general, MAC  Patient's Preference:   Intra-op Monitoring Plan:   Intra-op Monitoring Plan Comments:   Post Op Pain Control Plan:   Post Op Pain Control Plan Comments:   Induction:   IV  Beta Blocker:  Patient is not currently on a Beta-Blocker (No further documentation required).        Informed Consent: Patient understands risks and agrees with Anesthesia plan.  Questions answered. Anesthesia consent signed with patient.  ASA Score: 3     Day of Surgery Review of History & Physical:            Ready For Surgery From Anesthesia Perspective.

## 2017-09-08 NOTE — ANESTHESIA POSTPROCEDURE EVALUATION
"Anesthesia Post Evaluation    Patient: Elmer Boateng    Procedure(s) Performed: Procedure(s) (LRB):  ULTRASOUND-ENDOSCOPIC-UPPER (N/A)  ERCP (N/A)    Final Anesthesia Type: MAC  Patient location during evaluation: PACU  Patient participation: Yes- Able to Participate  Level of consciousness: awake and alert  Post-procedure vital signs: reviewed and stable  Pain management: adequate  Airway patency: patent  PONV status at discharge: No PONV  Anesthetic complications: no      Cardiovascular status: hemodynamically stable and blood pressure returned to baseline  Respiratory status: unassisted, room air and spontaneous ventilation  Hydration status: euvolemic  Follow-up not needed.        Visit Vitals  /73   Pulse 62   Temp 36.2 °C (97.1 °F) (Oral)   Resp 12   Ht 5' 9" (1.753 m)   Wt 86.8 kg (191 lb 5.8 oz)   SpO2 98%   BMI 28.26 kg/m²       Pain/Kike Score: Pain Assessment Performed: Yes (9/8/2017  1:40 PM)  Presence of Pain: denies (9/8/2017  2:05 PM)  Pain Rating Prior to Med Admin: 7 (9/8/2017  7:28 AM)  Kike Score: 10 (9/8/2017  2:05 PM)      "

## 2017-09-08 NOTE — PLAN OF CARE
Pt more awake and able to give me wife's cell #. Jory [phone]. Called and spoke to wife with update. States she is waiting in pt's room

## 2017-09-09 VITALS
WEIGHT: 191.38 LBS | TEMPERATURE: 100 F | RESPIRATION RATE: 20 BRPM | HEIGHT: 69 IN | SYSTOLIC BLOOD PRESSURE: 117 MMHG | OXYGEN SATURATION: 93 % | HEART RATE: 71 BPM | DIASTOLIC BLOOD PRESSURE: 62 MMHG | BODY MASS INDEX: 28.35 KG/M2

## 2017-09-09 PROBLEM — K80.43 CALCULUS OF BILE DUCT WITH ACUTE CHOLECYSTITIS AND OBSTRUCTION: Status: RESOLVED | Noted: 2017-09-08 | Resolved: 2017-09-09

## 2017-09-09 PROBLEM — K80.01 CALCULUS OF GALLBLADDER WITH ACUTE CHOLECYSTITIS AND OBSTRUCTION: Status: ACTIVE | Noted: 2017-09-09

## 2017-09-09 PROBLEM — D72.829 LEUKOCYTOSIS: Status: RESOLVED | Noted: 2017-09-08 | Resolved: 2017-09-09

## 2017-09-09 PROBLEM — E88.09 HYPERPROTEINEMIA: Status: RESOLVED | Noted: 2017-09-08 | Resolved: 2017-09-09

## 2017-09-09 PROBLEM — E83.52 HYPERCALCEMIA: Status: RESOLVED | Noted: 2017-09-08 | Resolved: 2017-09-09

## 2017-09-09 PROBLEM — N17.9 AKI (ACUTE KIDNEY INJURY): Status: RESOLVED | Noted: 2017-09-08 | Resolved: 2017-09-09

## 2017-09-09 LAB
ALBUMIN SERPL BCP-MCNC: 3 G/DL
ALP SERPL-CCNC: 130 U/L
ALT SERPL W/O P-5'-P-CCNC: 398 U/L
ANION GAP SERPL CALC-SCNC: 12 MMOL/L
AST SERPL-CCNC: 191 U/L
BASOPHILS # BLD AUTO: 0.03 K/UL
BASOPHILS NFR BLD: 0.4 %
BILIRUB SERPL-MCNC: 4.9 MG/DL
BUN SERPL-MCNC: 23 MG/DL
CALCIUM SERPL-MCNC: 8.6 MG/DL
CHLORIDE SERPL-SCNC: 100 MMOL/L
CHOLEST SERPL-MCNC: 103 MG/DL
CHOLEST/HDLC SERPL: 4.7 {RATIO}
CO2 SERPL-SCNC: 22 MMOL/L
CREAT SERPL-MCNC: 1.7 MG/DL
DIFFERENTIAL METHOD: ABNORMAL
EOSINOPHIL # BLD AUTO: 0.1 K/UL
EOSINOPHIL NFR BLD: 1.2 %
ERYTHROCYTE [DISTWIDTH] IN BLOOD BY AUTOMATED COUNT: 12 %
EST. GFR  (AFRICAN AMERICAN): 49 ML/MIN/1.73 M^2
EST. GFR  (NON AFRICAN AMERICAN): 43 ML/MIN/1.73 M^2
ESTIMATED AVG GLUCOSE: 174 MG/DL
GLUCOSE SERPL-MCNC: 316 MG/DL
HBA1C MFR BLD HPLC: 7.7 %
HCT VFR BLD AUTO: 29.8 %
HDLC SERPL-MCNC: 22 MG/DL
HDLC SERPL: 21.4 %
HGB BLD-MCNC: 10.2 G/DL
LDLC SERPL CALC-MCNC: 62.2 MG/DL
LIPASE SERPL-CCNC: 701 U/L
LYMPHOCYTES # BLD AUTO: 0.8 K/UL
LYMPHOCYTES NFR BLD: 10.6 %
MCH RBC QN AUTO: 33.4 PG
MCHC RBC AUTO-ENTMCNC: 34.2 G/DL
MCV RBC AUTO: 98 FL
MONOCYTES # BLD AUTO: 0.8 K/UL
MONOCYTES NFR BLD: 9.7 %
NEUTROPHILS # BLD AUTO: 6.1 K/UL
NEUTROPHILS NFR BLD: 77.8 %
NONHDLC SERPL-MCNC: 81 MG/DL
PLATELET # BLD AUTO: 202 K/UL
PMV BLD AUTO: 9.1 FL
POCT GLUCOSE: 190 MG/DL (ref 70–110)
POTASSIUM SERPL-SCNC: 3.7 MMOL/L
PROT SERPL-MCNC: 6.6 G/DL
RBC # BLD AUTO: 3.05 M/UL
SODIUM SERPL-SCNC: 134 MMOL/L
TRIGL SERPL-MCNC: 94 MG/DL
WBC # BLD AUTO: 7.77 K/UL

## 2017-09-09 PROCEDURE — 85025 COMPLETE CBC W/AUTO DIFF WBC: CPT

## 2017-09-09 PROCEDURE — 83690 ASSAY OF LIPASE: CPT

## 2017-09-09 PROCEDURE — 36415 COLL VENOUS BLD VENIPUNCTURE: CPT

## 2017-09-09 PROCEDURE — 94761 N-INVAS EAR/PLS OXIMETRY MLT: CPT

## 2017-09-09 PROCEDURE — 99232 SBSQ HOSP IP/OBS MODERATE 35: CPT | Mod: ,,, | Performed by: SURGERY

## 2017-09-09 PROCEDURE — 63600175 PHARM REV CODE 636 W HCPCS: Performed by: HOSPITALIST

## 2017-09-09 PROCEDURE — G0378 HOSPITAL OBSERVATION PER HR: HCPCS

## 2017-09-09 PROCEDURE — 25000003 PHARM REV CODE 250: Performed by: PHYSICIAN ASSISTANT

## 2017-09-09 PROCEDURE — 80061 LIPID PANEL: CPT

## 2017-09-09 PROCEDURE — 83036 HEMOGLOBIN GLYCOSYLATED A1C: CPT

## 2017-09-09 PROCEDURE — 80053 COMPREHEN METABOLIC PANEL: CPT

## 2017-09-09 PROCEDURE — 63600175 PHARM REV CODE 636 W HCPCS: Performed by: NURSE PRACTITIONER

## 2017-09-09 PROCEDURE — 25000003 PHARM REV CODE 250: Performed by: HOSPITALIST

## 2017-09-09 RX ORDER — METRONIDAZOLE 500 MG/1
500 TABLET ORAL EVERY 12 HOURS
Qty: 14 TABLET | Refills: 0 | Status: SHIPPED | OUTPATIENT
Start: 2017-09-09 | End: 2017-09-16

## 2017-09-09 RX ORDER — CIPROFLOXACIN 500 MG/1
500 TABLET ORAL EVERY 12 HOURS
Qty: 14 TABLET | Refills: 0 | Status: SHIPPED | OUTPATIENT
Start: 2017-09-09 | End: 2017-09-16

## 2017-09-09 RX ADMIN — MEROPENEM 1 G: 1 INJECTION, POWDER, FOR SOLUTION INTRAVENOUS at 10:09

## 2017-09-09 RX ADMIN — AMLODIPINE BESYLATE 10 MG: 5 TABLET ORAL at 08:09

## 2017-09-09 RX ADMIN — PANTOPRAZOLE SODIUM 40 MG: 40 TABLET, DELAYED RELEASE ORAL at 08:09

## 2017-09-09 NOTE — PLAN OF CARE
Problem: Patient Care Overview  Goal: Plan of Care Review  Sats 96% RA  , will continue to monitor.

## 2017-09-09 NOTE — PROGRESS NOTES
[facility]  General Surgery  Progress Note    Subjective:     History of Present Illness:  No notes on file    Post-Op Info:  Procedure(s) (LRB):  ULTRASOUND-ENDOSCOPIC-UPPER (N/A)  ERCP (N/A)   1 Day Post-Op     Interval History: No o/n events. Labs not drawn due to staffing issues, changed to STAT this am. Jl diet. No pain. No n/v. No f/c. Ambulating well.    Medications:  Continuous Infusions:   sodium chloride 0.9% 125 mL/hr at 09/08/17 1802     Scheduled Meds:   amlodipine  10 mg Oral Daily    meropenem (MERREM) IVPB  1 g Intravenous Q12H    pantoprazole  40 mg Oral Daily     PRN Meds:acetaminophen, cyclobenzaprine, dextrose 50%, dextrose 50%, glucagon (human recombinant), glucose, glucose, HYDROmorphone, insulin aspart, morphine, morphine, ondansetron, ondansetron, ramelteon, sodium chloride 0.9%     Review of patient's allergies indicates:  No Known Allergies  Objective:     Vital Signs (Most Recent):  Temp: 99.8 °F (37.7 °C) (09/09/17 0815)  Pulse: 71 (09/09/17 0815)  Resp: 20 (09/09/17 0815)  BP: 117/62 (09/09/17 0815)  SpO2: 96 % (09/09/17 0407) Vital Signs (24h Range):  Temp:  [97.1 °F (36.2 °C)-99.9 °F (37.7 °C)] 99.8 °F (37.7 °C)  Pulse:  [58-75] 71  Resp:  [9-24] 20  SpO2:  [94 %-98 %] 96 %  BP: ()/(52-81) 117/62     Weight: 86.8 kg (191 lb 5.8 oz)  Body mass index is 28.26 kg/m².    Intake/Output - Last 3 Shifts       09/07 0700 - 09/08 0659 09/08 0700 - 09/09 0659 09/09 0700 - 09/10 0659    P.O.  240     I.V. (mL/kg)  400 (4.6)     IV Piggyback  200     Total Intake(mL/kg)  840 (9.7)     Urine (mL/kg/hr)  2950     Stool  0     Total Output   2950      Net   -2110             Stool Occurrence  0 x           Physical Exam   Constitutional: He is oriented to person, place, and time. He appears well-developed and well-nourished. No distress.   HENT:   Head: Normocephalic and atraumatic.   Eyes: Conjunctivae and EOM are normal. Pupils are equal, round, and reactive to light.  No scleral icterus.   Neck: Normal range of motion. Neck supple. No JVD present.   Cardiovascular: Normal rate and regular rhythm.    Pulmonary/Chest: Effort normal and breath sounds normal. No respiratory distress.   Abdominal: Soft. Bowel sounds are normal. He exhibits no distension. There is no tenderness. There is no rebound and no guarding.   Musculoskeletal: Normal range of motion. He exhibits no edema or tenderness.   Neurological: He is alert and oriented to person, place, and time. No cranial nerve deficit.   Skin: Skin is warm and dry. He is not diaphoretic.   Psychiatric: He has a normal mood and affect.       Significant Labs:  CBC:   Lab Results   Component Value Date    WBC 7.77 09/09/2017    HGB 10.2 (L) 09/09/2017    HCT 29.8 (L) 09/09/2017    MCV 98 09/09/2017     09/09/2017     CMP - pending    Microbiology Results (last 7 days)     Procedure Component Value Units Date/Time    Blood culture [[id number]] Collected:  09/08/17 2016    Order Status:  Completed Specimen:  Blood Updated:  09/09/17 0515     Blood Culture, Routine No Growth to date    Blood culture [[id number]] Collected:  09/08/17 2016    Order Status:  Completed Specimen:  Blood Updated:  09/09/17 0515     Blood Culture, Routine No Growth to date      Bl cx 3am 9/8/17 - 1 of 2 with GNR     Significant Diagnostics:  EUS revealed sludge in CBD. ERCP performed, note and images pending    Assessment/Plan:     * Calculus of bile duct with acute cholecystitis and obstruction    S/p EUS and ERCP  Pt requests discharge and prefers outpatient [facility] with his surgeon in [facility] to d/c home on low fat ADA diet if lfts trending down        Bacteremia due to Gram-negative bacteria    ?contaminate  1 of 2 cx positive, repeat cx neg  tx per primary team        Leukocytosis    resolved        Type 2 diabetes mellitus with stage 3 chronic kidney disease, without long-term current use of insulin    Strict BG control for optimal wound healing               Yesy Ventura, DO  General Surgery  [facility]

## 2017-09-09 NOTE — PLAN OF CARE
Discharge orders noted, no HH or HME ordered.       09/09/17 1205   Final Note   Assessment Type Final Discharge Note   Discharge Disposition Home   What phone number can be called within the next 1-3 days to see how you are doing after discharge? [phone]   Hospital Follow Up  Appt(s) scheduled? No  (offices closed, [place] to follow up)   Right Care Referral Info   Post Acute Recommendation No Care     Lizzette Maurer, RN Transitional Navigator  [phone]

## 2017-09-09 NOTE — DISCHARGE SUMMARY
"[facility] Hospital Medicine  Discharge Summary      Patient Name: Elmer Boateng  MRN: [id number]  Admission Date: 9/8/2017  Hospital Length of Stay: 1 days  Discharge Date and Time:  09/09/2017 11:53 AM  Attending Physician: Gaetano Mota MD   Discharging Provider: Gaetano Mota MD  Primary Care Provider: Raffaele Ballard MD      HPI:   Elmer Boateng is a 62 year old white man with history of diabetes mellitus type 2, hypertension, hyperlipidemia, and kidney stones requiring 2 surgical removals and 2 lithotripsy procedures.  He is  with a 16 year old son.  He works in an oil field support role.  His primary care physician is Dr. Raffaele Ballard.  His endocrinologist is Dr. Delfino Cr.  His nephrologist is Dr. Lon Das.  His surgeon is Dr. Delfino Gutierrez.              He presented to [facility] in [place], [place] on 9/8/2017 with complaint of midepigastric abdominal pain and chest pain accompanied by nausea and vomiting for the past 5 days.  Work-up there was concerning for choledocholithiasis and cholecystitis.  He was transferred to [facility] for [facility] with Gastroenterology consult.  He had elevated WBC, total bilirubin, alkaline phosphatase, AST, ALT, calcium and protein.        Upper EUS 9/08/17: Endosonographic Finding :       Endosonographic imaging in the entire pancreas showed no abnormalities.       Endosonographic Finding :       A small amount of hyperechoic material consistent with sludge was visualized endosonographically in the lower third of the main bile duct.     Indwelling Lines/Drains at time of discharge: None    Hospital Course:   He was seen promptly upon arrival by [facility].  Dr. Roque Lindsay performed EUS and ERCP finding no stones.  He was feeling much better and requesting to eat and drink.  His wife, Ania, was present and stated that he looked "a million times better" " than earlier when he was in the ED at [facility].  He denied abdominal pain, nausea, chest pain, and shortness of breath.  General Surgery was also consulted to see him and recommended further management outpatient.  One of the two sets of blood cultures taken at [place] grew Gram negative rods.  He was already on meropenem.  Repeat blood cultures were taken.  He was discharged home on 9/9/17 with prescriptions for 7 days of ciprofloxacin and metronidazole.  He will follow up with a surgeon back home.     Consults:   Consults         Status Ordering Provider     Gastroenterology  Once     Provider:  Gregoria Spears MD    Acknowledged ELVIN FERRER     General Surgery  Once     Provider:  Yesy Ventura DO    Acknowledged ELVIN FERRER          Significant Diagnostic Studies:     Recent Labs  Lab 09/08/17  0302 09/09/17  0900    134*   K 4.4 3.7   CL 95 100   CO2 28 22*   BUN 33* 23   CREATININE 1.90* 1.7*   CALCIUM 10.3* 8.6*   PROT 8.5* 6.6   BILITOT 3.4* 4.9*   ALKPHOS 150* 130   * 398*   * 191*       Final Active Diagnoses:    Diagnosis Date Noted POA    Calculus of gallbladder with acute cholecystitis and obstruction [K80.01] 09/09/2017 Yes    Type 2 diabetes mellitus with stage 3 chronic kidney disease, without long-term current use of insulin [E11.22, N18.3] 09/08/2017 Yes     Chronic    Essential hypertension [I10] 09/08/2017 Yes     Chronic    Transaminitis [R74.0] 09/08/2017 Yes    Hyperbilirubinemia [E80.6] 09/08/2017 Yes    GERD (gastroesophageal reflux disease) [K21.9] 09/08/2017 Yes     Chronic    HLD (hyperlipidemia) [E78.5] 09/08/2017 Yes     Chronic    CKD stage 3 due to type 2 diabetes mellitus [E11.22, N18.3] 09/08/2017 Yes     Chronic    Bacteremia due to Gram-negative bacteria [R78.81] 09/08/2017 Yes      Problems Resolved During this Admission:    Diagnosis Date Noted Date Resolved POA    PRINCIPAL PROBLEM:  Calculus of bile duct with acute  cholecystitis and obstruction [K80.43] 09/08/2017 09/09/2017 Yes    Choledocholithiasis [K80.50]  09/09/2017 Unknown    Leukocytosis [D72.829] 09/08/2017 09/09/2017 Yes    Hypercalcemia [E83.52] 09/08/2017 09/09/2017 Yes    KATHARINE (acute kidney injury) [N17.9] 09/08/2017 09/09/2017 Yes    Hyperproteinemia [E88.09] 09/08/2017 09/09/2017 Yes      No new Assessment & Plan notes have been filed under this hospital service since the last note was generated.  Service: Hospital Medicine      Discharged Condition: good    Disposition: Home or Self Care    Follow Up:  Follow-up Information     Schedule an appointment as soon as possible for a visit with surgery.    Why:  gallbladder removal (cannot make an appontment on the weekend)               Patient Instructions:     Diet general   Order Specific Question Answer Comments   Additional restrictions: Diabetic 2000    Additional restrictions: Low Chol/Sat Fat      Activity as tolerated     Call MD for:  severe uncontrolled pain     Call MD for:  persistent nausea and vomiting or diarrhea     Call MD for:  temperature >100.4       Medications:  Reconciled Home Medications:   Current Discharge Medication List      START taking these medications    Details   ciprofloxacin HCl (CIPRO) 500 MG tablet Take 1 tablet (500 mg total) by mouth every 12 (twelve) hours.  Qty: 14 tablet, Refills: 0      metronidazole (FLAGYL) 500 MG tablet Take 1 tablet (500 mg total) by mouth every 12 (twelve) hours.  Qty: 14 tablet, Refills: 0         CONTINUE these medications which have NOT CHANGED    Details   amlodipine (NORVASC) 10 MG tablet Take 10 mg by mouth once daily.      bromocriptine (PARLODEL) 2.5 mg Tab Take 2.5 mg by mouth once daily.      cholecalciferol, vitamin D3, 5,000 unit TbDL Take 1 tablet by mouth once daily.      glipiZIDE (GLUCOTROL) 10 MG TR24 Take 10 mg by mouth 2 (two) times daily.       losartan-hydrochlorothiazide 50-12.5 mg (HYZAAR) 50-12.5 mg per tablet Take 1 tablet  by mouth once daily.      metformin (GLUCOPHAGE) 850 MG tablet Take 850 mg by mouth 3 (three) times daily.      pantoprazole (PROTONIX) 40 MG tablet Take 40 mg by mouth once daily.      pioglitazone (ACTOS) 15 MG tablet Take 15 mg by mouth once daily.      pravastatin (PRAVACHOL) 20 MG tablet Take 20 mg by mouth once daily.      SAXagliptin (ONGLYZA) 5 mg Tab tablet Take by mouth once daily.      cyclobenzaprine (FLEXERIL) 10 MG tablet Take 10 mg by mouth nightly as needed for Muscle spasms.       nateglinide (STARLIX) 120 MG tablet Take 60 mg by mouth daily as needed (If blood glucose > 200).              HOS POC IP DISCHARGE SUMMARY    Gaetano Mota MD  Department of Hospital Medicine  [facility]

## 2017-09-09 NOTE — ASSESSMENT & PLAN NOTE
Prescribe antibiotics.  Follow up culture and if resistance, change antibiotic and contact patient.

## 2017-09-09 NOTE — PROGRESS NOTES
Pt given discharge instructions and Rx, given opportunity to ask questions. No questions asked, verbalized understanding of discharge instructions. IV removed per protocol, pt tolerated well. Telemetry box removed and returned to monitor tech. Pt discharged home with belongings, accompanied by family.

## 2017-09-09 NOTE — PLAN OF CARE
Problem: Patient Care Overview  Goal: Plan of Care Review  Outcome: Ongoing (interventions implemented as appropriate)  Safety maintained  Some pain following supper    Served meatloaf of which he ate over half of portion given    Pt informed of medications ordered following ercp completion   Telemetry showing sinus rhythm   n practioner notified of blood culture report from [facility]

## 2017-09-09 NOTE — ASSESSMENT & PLAN NOTE
S/p EUS and ERCP  Pt requests discharge and prefers outpatient lap compa with his surgeon in [facility] to d/c home on low fat ADA diet if lfts trending down

## 2017-09-09 NOTE — SUBJECTIVE & OBJECTIVE
Interval History: Wants to go home.    Review of Systems   Constitutional: Negative for chills and fever.   Respiratory: Negative for cough and shortness of breath.      Objective:     Vital Signs (Most Recent):  Temp: 99.8 °F (37.7 °C) (09/09/17 0815)  Pulse: 71 (09/09/17 0815)  Resp: 20 (09/09/17 0815)  BP: 117/62 (09/09/17 0815)  SpO2: (!) 93 % (09/09/17 0838) Vital Signs (24h Range):  Temp:  [97.1 °F (36.2 °C)-99.8 °F (37.7 °C)] 99.8 °F (37.7 °C)  Pulse:  [58-75] 71  Resp:  [9-24] 20  SpO2:  [93 %-98 %] 93 %  BP: ()/(52-81) 117/62     Weight: 86.8 kg (191 lb 5.8 oz)  Body mass index is 28.26 kg/m².    Intake/Output Summary (Last 24 hours) at 09/09/17 1146  Last data filed at 09/09/17 0815   Gross per 24 hour   Intake             1640 ml   Output             3100 ml   Net            -1460 ml      Physical Exam   Constitutional: He is oriented to person, place, and time. He appears well-developed. No distress.   Cardiovascular: Normal rate and regular rhythm.    Pulmonary/Chest: Effort normal and breath sounds normal.   Abdominal: Soft. There is no guarding.   Neurological: He is alert and oriented to person, place, and time.   Psychiatric: He has a normal mood and affect.   Nursing note and vitals reviewed.      Significant Labs: All pertinent labs within the past 24 hours have been reviewed.    Significant Imaging: I have reviewed all pertinent imaging results/findings within the past 24 hours.   Upper EUS 9/08/17: Endosonographic Finding :       Endosonographic imaging in the entire pancreas showed no abnormalities.       Endosonographic Finding :       A small amount of hyperechoic material consistent with sludge was visualized endosonographically in the lower third of the main bile duct.

## 2017-09-09 NOTE — NURSING TRANSFER
Returned from procedure  Vitals taken as documented   Notified ALDEN Damian of pt's return to unit for any further orders

## 2017-09-09 NOTE — PLAN OF CARE
Date: 09/09/2017      Patient Name: Elmer Boateng  YOB: 1955  [place] [address]          [facility]  José Valles MD, [facility]     MD Ramez Sweeney, MIGUEL ÁNGEL Hines, NP  [address] [place] [address]  Office: [phone]  Fax: [phone] Elmer Boateng has been hospitalized at the [facility] since 9/8/2017.  Please excuse the patient from duties.  Patient may return on 9/11/17.  No restrictions.     Please contact me if you have any questions.                  __________________________  Geatano Mota MD  09/09/2017

## 2017-09-09 NOTE — PROGRESS NOTES
"[facility]  Progress Note    Patient Name: Elmer Boateng  MRN: [id number]  Patient Class: IP- Inpatient   Admission Date: 9/8/2017  Length of Stay: 1 days  Attending Physician: Gaetano Mota MD  Primary Care Provider: Raffaele Ballard MD        Subjective:     Principal Problem:Calculus of bile duct with acute cholecystitis and obstruction    HPI:  Elmer Boateng is a 62 year old white man with history of diabetes mellitus type 2, hypertension, hyperlipidemia, and kidney stones requiring 2 surgical removals and 2 lithotripsy procedures.  He is  with a 16 year old son.  He works in an oil field support role.  His primary care physician is Dr. Raffaele Ballard.  His endocrinologist is Dr. Delfino Cr.  His nephrologist is Dr. Lon Das.  His surgeon is Dr. Delfino Gutierrez.              He presented to [facility] in [place], [place] on 9/8/2017 with complaint of midepigastric abdominal pain and chest pain accompanied by nausea and vomiting for the past 5 days.  Work-up there was concerning for choledocholithiasis and cholecystitis.  He was transferred to [facility] for [facility] with Gastroenterology consult.  He had elevated WBC, total bilirubin, alkaline phosphatase, AST, ALT, calcium and protein.        Hospital Course:  He was seen promptly upon arrival by [facility].  Dr. Roque Lindsay performed EUS and ERCP finding no stones.  He was feeling much better and requesting to eat and drink.  His wife, Ania, was present and stated that he looked "a million times better" than earlier when he was in the ED at [facility].  He denied abdominal pain, nausea, chest pain, and shortness of breath.  General Surgery was also consulted to see him and recommended further management outpatient.  One of the two sets of blood cultures taken at [place] grew Gram negative rods.  He was already on " meropenem.  Repeat blood cultures were taken.  He was discharged home on 9/9/17 with prescriptions for 7 days of ciprofloxacin and metronidazole.  He will follow up with a surgeon back home.    Interval History: Wants to go home.    Review of Systems   Constitutional: Negative for chills and fever.   Respiratory: Negative for cough and shortness of breath.      Objective:     Vital Signs (Most Recent):  Temp: 99.8 °F (37.7 °C) (09/09/17 0815)  Pulse: 71 (09/09/17 0815)  Resp: 20 (09/09/17 0815)  BP: 117/62 (09/09/17 0815)  SpO2: (!) 93 % (09/09/17 0838) Vital Signs (24h Range):  Temp:  [97.1 °F (36.2 °C)-99.8 °F (37.7 °C)] 99.8 °F (37.7 °C)  Pulse:  [58-75] 71  Resp:  [9-24] 20  SpO2:  [93 %-98 %] 93 %  BP: ()/(52-81) 117/62     Weight: 86.8 kg (191 lb 5.8 oz)  Body mass index is 28.26 kg/m².    Intake/Output Summary (Last 24 hours) at 09/09/17 1146  Last data filed at 09/09/17 0815   Gross per 24 hour   Intake             1640 ml   Output             3100 ml   Net            -1460 ml      Physical Exam   Constitutional: He is oriented to person, place, and time. He appears well-developed. No distress.   Cardiovascular: Normal rate and regular rhythm.    Pulmonary/Chest: Effort normal and breath sounds normal.   Abdominal: Soft. There is no guarding.   Neurological: He is alert and oriented to person, place, and time.   Psychiatric: He has a normal mood and affect.   Nursing note and vitals reviewed.      Significant Labs: All pertinent labs within the past 24 hours have been reviewed.    Significant Imaging: I have reviewed all pertinent imaging results/findings within the past 24 hours.   Upper EUS 9/08/17: Endosonographic Finding :       Endosonographic imaging in the entire pancreas showed no abnormalities.       Endosonographic Finding :       A small amount of hyperechoic material consistent with sludge was visualized endosonographically in the lower third of the main bile duct.    Assessment/Plan:       Calculus of gallbladder with acute cholecystitis and obstruction    Follow up with a surgeon.          Bacteremia due to Gram-negative bacteria    Prescribe antibiotics.  Follow up culture and if resistance, change antibiotic and contact patient.          HLD (hyperlipidemia)    Check lipid panel with AM labs.  On pravastatin 20 mg and co-Q-10 200 mg daily which will be held here for now.            GERD (gastroesophageal reflux disease)    Continue home pantaprazole 40 mg daily.           Essential hypertension    Fluctuating BP.  On amlodipine 10 mg daily and losartan-HCTZ 50-12.5 mg daily at home.  Monitor. Resume home amlodipine with parameters. Hold losartan-HCTZ due to elevated creatinine with unknown baseline.           Type 2 diabetes mellitus with stage 3 chronic kidney disease, without long-term current use of insulin    Pt states last A1c 7.7%.  BG > 200 since arrival to [facility].  On glipizide 10 mg BID, metformin 850 mg TID, nateglinide 60 mg prn BG > 200, pioglitazone 15 mg daily and saxaglipitin 5 mg daily at home. Holding all here. Check blood glucose AC and HS and use SSI.  Check A1c.  Diabetic diet when tolerating PO.  Endocrinologist is Dr. Delfino Cr.             VTE Risk Mitigation         Ordered     Medium Risk of VTE  Once      09/08/17 0942     Place IGNACIO hose  Until discontinued      09/08/17 0942     Place sequential compression device  Until discontinued      09/08/17 0942          Time Spent:  I spent 35 minutes on this discharge, which includes examination, reviewing hospital course with patient/family, reviewing discharge medications and arranging follow-up care.    Gaetano Mota MD  Department of Hospital Medicine   [facility]

## 2017-09-09 NOTE — SUBJECTIVE & OBJECTIVE
Interval History: No o/n events. Labs not drawn due to staffing issues, changed to STAT this am. Jl diet. No pain. No n/v. No f/c. Ambulating well.    Medications:  Continuous Infusions:   sodium chloride 0.9% 125 mL/hr at 09/08/17 1802     Scheduled Meds:   amlodipine  10 mg Oral Daily    meropenem (MERREM) IVPB  1 g Intravenous Q12H    pantoprazole  40 mg Oral Daily     PRN Meds:acetaminophen, cyclobenzaprine, dextrose 50%, dextrose 50%, glucagon (human recombinant), glucose, glucose, HYDROmorphone, insulin aspart, morphine, morphine, ondansetron, ondansetron, ramelteon, sodium chloride 0.9%     Review of patient's allergies indicates:  No Known Allergies  Objective:     Vital Signs (Most Recent):  Temp: 99.8 °F (37.7 °C) (09/09/17 0815)  Pulse: 71 (09/09/17 0815)  Resp: 20 (09/09/17 0815)  BP: 117/62 (09/09/17 0815)  SpO2: 96 % (09/09/17 0407) Vital Signs (24h Range):  Temp:  [97.1 °F (36.2 °C)-99.9 °F (37.7 °C)] 99.8 °F (37.7 °C)  Pulse:  [58-75] 71  Resp:  [9-24] 20  SpO2:  [94 %-98 %] 96 %  BP: ()/(52-81) 117/62     Weight: 86.8 kg (191 lb 5.8 oz)  Body mass index is 28.26 kg/m².    Intake/Output - Last 3 Shifts       09/07 0700 - 09/08 0659 09/08 0700 - 09/09 0659 09/09 0700 - 09/10 0659    P.O.  240     I.V. (mL/kg)  400 (4.6)     IV Piggyback  200     Total Intake(mL/kg)  840 (9.7)     Urine (mL/kg/hr)  2950     Stool  0     Total Output   2950      Net   -2110             Stool Occurrence  0 x           Physical Exam   Constitutional: He is oriented to person, place, and time. He appears well-developed and well-nourished. No distress.   HENT:   Head: Normocephalic and atraumatic.   Eyes: Conjunctivae and EOM are normal. Pupils are equal, round, and reactive to light. No scleral icterus.   Neck: Normal range of motion. Neck supple. No JVD present.   Cardiovascular: Normal rate and regular rhythm.    Pulmonary/Chest: Effort normal and breath sounds normal. No respiratory distress.   Abdominal:  Soft. Bowel sounds are normal. He exhibits no distension. There is no tenderness. There is no rebound and no guarding.   Musculoskeletal: Normal range of motion. He exhibits no edema or tenderness.   Neurological: He is alert and oriented to person, place, and time. No cranial nerve deficit.   Skin: Skin is warm and dry. He is not diaphoretic.   Psychiatric: He has a normal mood and affect.       Significant Labs:  CBC:   Lab Results   Component Value Date    WBC 7.77 09/09/2017    HGB 10.2 (L) 09/09/2017    HCT 29.8 (L) 09/09/2017    MCV 98 09/09/2017     09/09/2017     CMP - pending    Microbiology Results (last 7 days)     Procedure Component Value Units Date/Time    Blood culture [[id number]] Collected:  09/08/17 2016    Order Status:  Completed Specimen:  Blood Updated:  09/09/17 0515     Blood Culture, Routine No Growth to date    Blood culture [[id number]] Collected:  09/08/17 2016    Order Status:  Completed Specimen:  Blood Updated:  09/09/17 0515     Blood Culture, Routine No Growth to date      Bl cx 3am 9/8/17 - 1 of 2 with GNR     Significant Diagnostics:  EUS revealed sludge in CBD. ERCP performed, note and images pending

## 2017-09-09 NOTE — PLAN OF CARE
Problem: Patient Care Overview  Goal: Plan of Care Review  Outcome: Ongoing (interventions implemented as appropriate)  Pt on RA with sats of 93%, Will continue to monitor.

## 2017-09-13 LAB
BACTERIA BLD CULT: NORMAL
BACTERIA BLD CULT: NORMAL

## 2020-11-11 ENCOUNTER — HOSPITAL ENCOUNTER (EMERGENCY)
Facility: HOSPITAL | Age: 65
Discharge: HOME | End: 2020-11-11
Attending: EMERGENCY MEDICINE
Payer: COMMERCIAL

## 2020-11-11 VITALS
BODY MASS INDEX: 28.79 KG/M2 | HEIGHT: 68 IN | DIASTOLIC BLOOD PRESSURE: 71 MMHG | OXYGEN SATURATION: 95 % | SYSTOLIC BLOOD PRESSURE: 143 MMHG | TEMPERATURE: 98 F | HEART RATE: 75 BPM | RESPIRATION RATE: 18 BRPM | WEIGHT: 190 LBS

## 2020-11-11 DIAGNOSIS — S20.212A RIB CONTUSION, LEFT, INITIAL ENCOUNTER: Primary | ICD-10-CM
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
## 2020-11-11 PROCEDURE — 90471 IMMUNIZATION ADMIN: CPT | Performed by: EMERGENCY MEDICINE

## 2020-11-11 PROCEDURE — 90715 TDAP VACCINE 7 YRS/> IM: CPT | Performed by: EMERGENCY MEDICINE

## 2020-11-11 PROCEDURE — 99283 PR EMERGENCY DEPT VISIT,LEVEL III: ICD-10-PCS | Mod: ,,, | Performed by: EMERGENCY MEDICINE

## 2020-11-11 PROCEDURE — 99283 EMERGENCY DEPT VISIT LOW MDM: CPT | Mod: ,,, | Performed by: EMERGENCY MEDICINE

## 2020-11-11 PROCEDURE — 25000003 PHARM REV CODE 250: Performed by: EMERGENCY MEDICINE

## 2020-11-11 PROCEDURE — 63600175 PHARM REV CODE 636 W HCPCS: Performed by: EMERGENCY MEDICINE

## 2020-11-11 PROCEDURE — 99284 EMERGENCY DEPT VISIT MOD MDM: CPT | Mod: 25

## 2020-11-11 RX ORDER — NAPROXEN 500 MG/1
500 TABLET ORAL 2 TIMES DAILY WITH MEALS
Qty: 14 TABLET | Refills: 0 | Status: SHIPPED | OUTPATIENT
Start: 2020-11-11 | End: 2020-11-18

## 2020-11-11 RX ORDER — BACITRACIN ZINC 500 [USP'U]/G
1 OINTMENT TOPICAL
Status: COMPLETED | OUTPATIENT
Start: 2020-11-11 | End: 2020-11-11

## 2020-11-11 RX ADMIN — CLOSTRIDIUM TETANI TOXOID ANTIGEN (FORMALDEHYDE INACTIVATED), CORYNEBACTERIUM DIPHTHERIAE TOXOID ANTIGEN (FORMALDEHYDE INACTIVATED), BORDETELLA PERTUSSIS TOXOID ANTIGEN (GLUTARALDEHYDE INACTIVATED), BORDETELLA PERTUSSIS FILAMENTOUS HEMAGGLUTININ ANTIGEN (FORMALDEHYDE INACTIVATED), BORDETELLA PERTUSSIS PERTACTIN ANTIGEN, AND BORDETELLA PERTUSSIS FIMBRIAE 2/3 ANTIGEN 0.5 ML: 5; 2; 2.5; 5; 3; 5 INJECTION, SUSPENSION INTRAMUSCULAR at 01:11

## 2020-11-11 RX ADMIN — BACITRACIN 1 EACH: 500 OINTMENT TOPICAL at 01:11

## 2020-11-11 NOTE — ED NOTES
Bed: [facility]  Expected date:   Expected time:   Means of arrival:   Comments:

## 2020-11-11 NOTE — DISCHARGE INSTRUCTIONS
Today your evaluation did not show any fractures. Please follow-up with your primary care if not improved in 1 week. You may use massage packs and salon pas.     Our goal in the emergency department is to always give you outstanding care and exceptional service. You may receive a survey by mail or e-mail in the next week regarding your experience in our ED. We would greatly appreciate your completing and returning the survey. Your feedback provides us with a way to recognize our staff who give very good care and it helps us learn how to improve when your experience was below our aspiration of excellence.

## 2020-11-11 NOTE — ED PROVIDER NOTES
Encounter Date: 11/11/2020    SCRIBE #1 NOTE: I, Yue Kelly, am scribing for, and in the presence of,  Dr. Valentin. I have scribed the entire note.       History     Chief Complaint   Patient presents with    Fall     missed step at clinic and fell injurying left ribs.  Pt denies LOC and - blood thinners.      The patient is a 65 year old male with a PMHx of DM, GERD, and HTN, who presents to the ED with a chief complaint of left rb pain s/p trip and fall. Patient reports that he was turning a dolley at work today when he stepped custodial off of a step and fell backward. He reached to try to grab something to catch himself and landed on his left side. Endorses ecchymosis to left chest over ribs. He since he landed on hedges, he also endorses some small abrasions and puncture wounds on his arms, especially the left. Tetanus not UTD. Denies head trauma or LOC.     The history is provided by the patient and medical records.     Review of patient's allergies indicates:  No Known Allergies  Past Medical History:   Diagnosis Date    Cholecystitis 09/08/2017    Diabetes mellitus     GERD (gastroesophageal reflux disease)     HTN (hypertension)     Hypercholesteremia      Past Surgical History:   Procedure Laterality Date    HERNIA REPAIR      KNEE SURGERY      LITHOTRIPSY       Family History   Problem Relation Age of Onset    COPD Mother     Diabetes Mother     Heart disease Father     Diabetes Sister      Social History     Tobacco Use    Smoking status: Never Smoker    Smokeless tobacco: Never Used   Substance Use Topics    Alcohol use: No    Drug use: No     Review of Systems   Constitutional: Negative for chills and fever.   HENT: Negative for congestion and rhinorrhea.    Eyes: Negative for photophobia and visual disturbance.   Respiratory: Negative for cough and shortness of breath.    Cardiovascular: Positive for chest pain (musculoskeletal left ribcage). Negative for leg swelling.    Gastrointestinal: Negative for abdominal pain and nausea.   Genitourinary: Negative for difficulty urinating and dysuria.   Musculoskeletal: Positive for myalgias (left ribs). Negative for back pain.   Skin: Positive for color change (bruise over left ribs) and wound (abrasions and puncture wounds on arms).   Neurological: Negative for syncope and headaches.       Physical Exam     Initial Vitals [11/11/20 1157]   BP Pulse Resp Temp SpO2   (!) 143/71 75 18 97.5 °F (36.4 °C) 95 %      MAP       --         Physical Exam    Vitals reviewed.    Gen/Constitutional: Interactive. No acute distress  Head: Normocephalic, Atraumatic  Neck: supple, no masses or LAD  Eyes: PERRLA, conjunctiva clear  Ears, Nose and Throat: No rhinorrhea or stridor.  Cardiac:  Regular rate, Reg Rhythm, No murmur  Chest wall:  Left-sided rib cage with contusion and abrasion  Pulmonary: CTA Bilat, no wheezes, rhonchi, rales.  GI: Abdomen soft, non-tender, non-distended; no rebound or guarding  : No CVA tenderness.  Musculoskeletal: Extremities warm, well perfused, no erythema, no edema  Skin: No rashes; abrasions on bilateral arms  Neuro: Alert and Oriented x 3; No focal motor or sensory deficits.    Psych: Normal affect        ED Course   Procedures  Labs Reviewed - No data to display       Imaging Results          X-Ray Ribs 2 View Left (Final result)  Result time 11/11/20 13:28:36    Final result by Marck Dodge III, MD (11/11/20 13:28:36)                 Narrative:    EXAMINATION:  XR RIBS 2 VIEW LEFT    CLINICAL HISTORY:  Unspecified fall, initial encounter    FINDINGS:  Two views left: No fracture dislocation bone destruction seen.  No trauma seen.  No pneumothorax pleural fluid or lung contusion seen.      Electronically signed by: Marck Dodge MD  Date:    11/11/2020  Time:    13:28                             X-Ray Chest PA And Lateral (Final result)  Result time 11/11/20 13:28:12    Final result by Marck Dodge III, MD  (11/11/20 13:28:12)                 Impression:      No acute process seen.      Electronically signed by: Marck Dodge MD  Date:    11/11/2020  Time:    13:28             Narrative:    EXAMINATION:  XR CHEST PA AND LATERAL    CLINICAL HISTORY:  Unspecified fall, initial encounter    FINDINGS:  Two views: Heart size is normal.  Lungs are clear.  The bones showed DJD.                              X-Rays:   Independently Interpreted Readings:   Chest X-Ray: Normal heart size.  No infiltrates.  No acute abnormalities. No pneumothorax, free air, pulmonary contusions and no notable fractures   Other Readings:  Left-sided rib x-ray:  No acute fractures or pulmonary contusion    Medical Decision Making:   History:   Old Medical Records: I decided to obtain old medical records.  Initial Assessment:   The patient is a 65 year old male with a PMHx of DM, GERD, and HTN, who presents to the ED with a chief complaint of left rb pain s/p trip and fall.   Differential Diagnosis:   Differential diagnosis includes but is not limited to:  Pulmonary contusion, rib contusion, rib fracture, pneumothorax, hemothorax, contusion, abrasion, laceration      Independently Interpreted Test(s):   I have ordered and independently interpreted X-rays - see prior notes.  Clinical Tests:   Radiological Study: Reviewed and Ordered    Urgent evaluation of patient presenting with recent trip and fall onto his left side striking his ribcage on the ground.  He has notable abrasions and contusions along the left rib cage in bilateral arms without lacerations or bony deformities.  He is tender along the left rib cage.  X-rays of the ribcage and chest were both obtained which show no evidence of free air, pneumothorax, fractures or pulmonary contusion on my read.  Patient's pain was managed with anti-inflammatory and instructed to use OTC Tylenol and anti-inflammatory as needed.  Bacitracin zinc ointment was placed on the abrasions sustained on his arms.   He was updated on his tetanus as his last tetanus is unknown given the news streaks, scrapes and abrasions from the ground during his fall.  No other red flags at this time to include pneumothorax, pneumonia, fracture, pulmonary contusion, or mediastinal free air. Patient agreeable to discharge plan. Strict ED precautions and return instructions discussed at length and patient verbalized understanding. All questions were answered and ample time was given for questions.      Complexity:  Moderate high          Scribe Attestation:   Scribe #1: I performed the above scribed service and the documentation accurately describes the services I performed. I attest to the accuracy of the note.    I, Dr. Nabil Valentin, personally performed the services described in this documentation. All medical record entries made by the scribe were at my direction and in my presence.  I have reviewed the chart and agree that the record reflects my personal performance and is accurate and complete.                     Clinical Impression:       ICD-10-CM ICD-9-CM   1. Rib contusion, left, initial encounter  S20.212A 922.1   2. Fall  W19.XXXA E888.9                      Disposition:   Disposition: Discharged  Condition: Stable     ED Disposition Condition    Discharge Stable        ED Prescriptions     Medication Sig Dispense Start Date End Date Auth. Provider    naproxen (NAPROSYN) 500 MG tablet Take 1 tablet (500 mg total) by mouth 2 (two) times daily with meals. for 7 days 14 tablet 11/11/2020 11/18/2020 Nabil Valentin DO        Follow-up Information     Follow up With Specialties Details Why Contact Info    Raffaele Ballard MD Internal Medicine Schedule an appointment as soon as possible for a visit in 1 week If symptoms worsen, As needed [address] [place] [id number]  [phone]                        Nabil Valentin DO, FAAEM  Emergency Staff Physician   Dept of Emergency Medicine   [facility]  Spectralink:  [phone]                   Nabil Valentin DO  11/13/20 0856

## 2020-11-11 NOTE — Clinical Note
"Elmer Branchsheila Boateng was seen and treated in our emergency department on 11/11/2020.  He may return to work on 11/13/2020.       If you have any questions or concerns, please don't hesitate to call.      Eduardo Harvey RN RN    "

## 2020-11-11 NOTE — Clinical Note
"Elmer Gonzalez"Kilo was seen and treated in our emergency department on 11/11/2020.  He may return to work on 11/13/2020.       If you have any questions or concerns, please don't hesitate to call.      Eduardo Harvey RN    "

## 2020-11-11 NOTE — ED NOTES
Patient comes into ER with complaints of a fall down three steps around 11 am. Patient states that he is having pain to the left rib and right hip. Patient has scattered abrasions to bilateral arms from landing the bushes.

## 2022-07-12 PROBLEM — N18.30 TYPE 2 DIABETES MELLITUS WITH STAGE 3 CHRONIC KIDNEY DISEASE, WITH LONG-TERM CURRENT USE OF INSULIN: Status: ACTIVE | Noted: 2017-09-08

## 2022-07-12 PROBLEM — E11.22 TYPE 2 DIABETES MELLITUS WITH STAGE 3 CHRONIC KIDNEY DISEASE, WITH LONG-TERM CURRENT USE OF INSULIN: Status: ACTIVE | Noted: 2017-09-08

## 2022-07-12 PROBLEM — Z79.4 TYPE 2 DIABETES MELLITUS WITH STAGE 3 CHRONIC KIDNEY DISEASE, WITH LONG-TERM CURRENT USE OF INSULIN: Status: ACTIVE | Noted: 2017-09-08

## 2022-07-12 PROBLEM — N39.0 UTI (URINARY TRACT INFECTION): Status: ACTIVE | Noted: 2022-07-12

## 2022-11-08 NOTE — SUBJECTIVE & OBJECTIVE
Past Medical History:   Diagnosis Date    Diabetes mellitus     GERD (gastroesophageal reflux disease)     HTN (hypertension)     Hypercholesteremia        Past Surgical History:   Procedure Laterality Date    HERNIA REPAIR      KNEE SURGERY      LITHOTRIPSY         Review of patient's allergies indicates:  No Known Allergies    Current Facility-Administered Medications on File Prior to Encounter   Medication    [COMPLETED] 0.9%  NaCl infusion    [COMPLETED] morphine injection 4 mg    [COMPLETED] morphine injection 4 mg    [COMPLETED] ondansetron injection 4 mg    [COMPLETED] piperacillin-tazobactam 4.5 g in dextrose 5 % 100 mL IVPB (ready to mix system)    [COMPLETED] prochlorperazine injection Soln 10 mg    [COMPLETED] sodium chloride 0.9% bolus 1,000 mL     Current Outpatient Prescriptions on File Prior to Encounter   Medication Sig    amlodipine (NORVASC) 10 MG tablet Take 10 mg by mouth once daily.    bromocriptine (PARLODEL) 2.5 mg Tab Take 2.5 mg by mouth once daily.    cholecalciferol, vitamin D3, 5,000 unit TbDL Take 1 tablet by mouth once daily.    glipiZIDE (GLUCOTROL) 10 MG TR24 Take 10 mg by mouth 2 (two) times daily.     losartan-hydrochlorothiazide 50-12.5 mg (HYZAAR) 50-12.5 mg per tablet Take 1 tablet by mouth once daily.    metformin (GLUCOPHAGE) 850 MG tablet Take 850 mg by mouth 3 (three) times daily.    pantoprazole (PROTONIX) 40 MG tablet Take 40 mg by mouth once daily.    pioglitazone (ACTOS) 15 MG tablet Take 15 mg by mouth once daily.    pravastatin (PRAVACHOL) 20 MG tablet Take 20 mg by mouth once daily.    SAXagliptin (ONGLYZA) 5 mg Tab tablet Take by mouth once daily.    cyclobenzaprine (FLEXERIL) 10 MG tablet Take 10 mg by mouth nightly as needed for Muscle spasms.     nateglinide (STARLIX) 120 MG tablet Take 60 mg by mouth daily as needed (If blood glucose > 200).     [DISCONTINUED] co-enzyme Q-10 30 mg capsule Take 30 mg by mouth 3 (three) times daily.      Family History     Problem Relation (Age of Onset)    COPD Mother    Diabetes Mother, Sister    Heart disease Father        Social History Main Topics    Smoking status: Never Smoker    Smokeless tobacco: Never Used    Alcohol use No    Drug use: No    Sexual activity: Not on file     Review of Systems   Constitutional: Positive for appetite change. Negative for chills, fatigue and fever.   HENT: Negative for congestion, postnasal drip, sneezing and sore throat.    Eyes: Negative for discharge, redness and itching.   Respiratory: Negative for cough, shortness of breath and wheezing.    Cardiovascular: Negative for chest pain, palpitations and leg swelling.   Gastrointestinal: Positive for abdominal pain and vomiting. Negative for abdominal distention, blood in stool, constipation and diarrhea.   Endocrine: Negative for polydipsia, polyphagia and polyuria.   Genitourinary: Negative for difficulty urinating, dysuria, flank pain, frequency, hematuria and urgency.   Musculoskeletal: Negative for arthralgias and myalgias.   Skin: Negative for pallor, rash and wound.   Neurological: Negative for dizziness, syncope, weakness, light-headedness, numbness and headaches.   Psychiatric/Behavioral: Negative for agitation and confusion. The patient is not nervous/anxious.      Objective:     Vital Signs (Most Recent):  Temp: 99 °F (37.2 °C) (09/08/17 1436)  Pulse: 66 (09/08/17 1436)  Resp: (!) 24 (09/08/17 1436)  BP: (!) 169/77 (09/08/17 1436)  SpO2: 98 % (09/08/17 1405) Vital Signs (24h Range):  Temp:  [97.1 °F (36.2 °C)-99.9 °F (37.7 °C)] 99 °F (37.2 °C)  Pulse:  [57-84] 66  Resp:  [9-24] 24  SpO2:  [94 %-100 %] 98 %  BP: ()/(52-77) 169/77     Weight: 86.8 kg (191 lb 5.8 oz)  Body mass index is 28.26 kg/m².    Physical Exam   Constitutional: He is oriented to person, place, and time. He appears well-developed and well-nourished. No distress.   HENT:   Head: Normocephalic and atraumatic.   Mouth/Throat: Oropharynx  is clear and moist. No oropharyngeal exudate.   Eyes: Conjunctivae and EOM are normal. Pupils are equal, round, and reactive to light. No scleral icterus.   Neck: Normal range of motion. Neck supple. No JVD present. No thyromegaly present.   Cardiovascular: Normal rate and regular rhythm.    No murmur heard.  Pulmonary/Chest: Effort normal and breath sounds normal. No respiratory distress. He has no wheezes. He exhibits no tenderness.   Abdominal: Soft. Bowel sounds are normal. He exhibits no distension. There is no tenderness. There is no rebound and no guarding.   Musculoskeletal: Normal range of motion. He exhibits no edema or deformity.   Neurological: He is alert and oriented to person, place, and time.   Skin: Skin is warm and dry. No rash noted. He is not diaphoretic.   Psychiatric: He has a normal mood and affect. His behavior is normal. Judgment and thought content normal.        Significant Labs:   CBC:   Recent Labs  Lab 09/08/17  0302   WBC 14.30*   HGB 12.1*   HCT 35.7*        CMP:   Recent Labs  Lab 09/08/17  0302      K 4.4   CL 95   CO2 28   *   BUN 33*   CREATININE 1.90*   CALCIUM 10.3*   PROT 8.5*   ALBUMIN 4.7   BILITOT 3.4*   ALKPHOS 150*   *   *   EGFRNONAA 37*     Troponin:   Recent Labs  Lab 09/08/17  0302   TROPONINI <0.012     Urine Studies:   Recent Labs  Lab 09/08/17  0535   COLORU Yellow   APPEARANCEUA Clear   PHUR 5.5   SPECGRAV 1.015   PROTEINUA 30*   GLUCUA 500*   KETONESU 15*   BILIRUBINUA Negative   OCCULTUA Trace*   NITRITE Negative   UROBILINOGEN 2.0*   LEUKOCYTESUR Negative   RBCUA 0   WBCUA 1   BACTERIA None   SQUAMEPITHEL 1   HYALINECASTS 0       Significant Imaging:     US Abdomen 8/8/2017:  Evidence of tiny gallstones/sludge with no sonographic findings to suggest acute cholecystitis.  Mild dilatation of the CBD with no sonographic evidence of choledocholithiasis.    Flat and erect abdomen 9/8/17:  Nonobstructive bowel gas pattern. Bilateral  nephrolithiasis.    CXR PA and lateral 9/8/2017:   Findings: Cardiac silhouette is normal in size. No focal infiltrate or pleural effusion. There is mild thoracic spondylosis.    CT Abdomen/Pelvis 9/8/2017:  No evidence of an acute intra-abdominal abnormality.  Mildly distended gallbladder containing sludge and/or gallstones.  Bilateral nephrolithiasis without signs of obstructive uropathy.  Colonic diverticulosis.       Pt found lying in bed without c/o, pleasant and engaging and agreeable to PT

## 2023-06-20 PROBLEM — R74.01 TRANSAMINITIS: Status: RESOLVED | Noted: 2017-09-08 | Resolved: 2023-06-20

## 2023-06-20 PROBLEM — E80.6 HYPERBILIRUBINEMIA: Status: RESOLVED | Noted: 2017-09-08 | Resolved: 2023-06-20
